# Patient Record
Sex: FEMALE | Race: WHITE | NOT HISPANIC OR LATINO | Employment: OTHER | URBAN - METROPOLITAN AREA
[De-identification: names, ages, dates, MRNs, and addresses within clinical notes are randomized per-mention and may not be internally consistent; named-entity substitution may affect disease eponyms.]

---

## 2017-03-09 RX ORDER — DULOXETIN HYDROCHLORIDE 30 MG/1
CAPSULE, DELAYED RELEASE ORAL
Qty: 60 CAPSULE | Refills: 0 | OUTPATIENT
Start: 2017-03-09

## 2017-11-09 ENCOUNTER — TRANSCRIBE ORDERS (OUTPATIENT)
Dept: SLEEP MEDICINE | Facility: HOSPITAL | Age: 67
End: 2017-11-09

## 2017-11-09 DIAGNOSIS — R29.818 SUSPECTED SLEEP APNEA: Primary | ICD-10-CM

## 2017-11-14 ENCOUNTER — HOSPITAL ENCOUNTER (OUTPATIENT)
Dept: SLEEP MEDICINE | Facility: HOSPITAL | Age: 67
Discharge: HOME | End: 2017-11-14
Admitting: INTERNAL MEDICINE

## 2017-11-14 DIAGNOSIS — R29.818 SUSPECTED SLEEP APNEA: ICD-10-CM

## 2017-11-14 PROCEDURE — 95810 POLYSOM 6/> YRS 4/> PARAM: CPT

## 2017-11-18 RX ORDER — PRAMIPEXOLE DIHYDROCHLORIDE 0.25 MG/1
0.25 TABLET ORAL 3 TIMES DAILY
Qty: 30 TABLET | Refills: 2 | Status: SHIPPED | OUTPATIENT
Start: 2017-11-18 | End: 2017-12-18

## 2017-11-18 RX ORDER — ZOLPIDEM TARTRATE 5 MG/1
TABLET ORAL
Qty: 2 TABLET | Refills: 0 | Status: SHIPPED | OUTPATIENT
Start: 2017-11-18 | End: 2019-04-01 | Stop reason: HOSPADM

## 2017-11-21 ENCOUNTER — TELEPHONE (OUTPATIENT)
Dept: SLEEP MEDICINE | Facility: HOSPITAL | Age: 67
End: 2017-11-21

## 2017-11-21 NOTE — TELEPHONE ENCOUNTER
Spoke with pt about ss results, scheduled titration study for 02/12/17.  Sending abien script to [facility] pharmacy.  Pt already began mirapex

## 2017-12-08 ENCOUNTER — OFFICE VISIT (OUTPATIENT)
Dept: OBSTETRICS AND GYNECOLOGY | Facility: CLINIC | Age: 67
End: 2017-12-08

## 2017-12-08 VITALS
DIASTOLIC BLOOD PRESSURE: 54 MMHG | HEIGHT: 64 IN | WEIGHT: 181 LBS | SYSTOLIC BLOOD PRESSURE: 98 MMHG | HEART RATE: 63 BPM | BODY MASS INDEX: 30.9 KG/M2

## 2017-12-08 DIAGNOSIS — N81.6 RECTOCELE: Primary | ICD-10-CM

## 2017-12-08 PROCEDURE — 99203 OFFICE O/P NEW LOW 30 MIN: CPT | Performed by: OBSTETRICS & GYNECOLOGY

## 2017-12-08 RX ORDER — FLUTICASONE PROPIONATE 50 MCG
SPRAY, SUSPENSION (ML) NASAL
Refills: 0 | COMMUNITY
Start: 2017-12-01

## 2017-12-08 RX ORDER — BENZONATATE 200 MG/1
CAPSULE ORAL
Refills: 0 | COMMUNITY
Start: 2017-11-30 | End: 2017-12-08

## 2017-12-08 RX ORDER — AMITRIPTYLINE HYDROCHLORIDE 100 MG/1
TABLET, FILM COATED ORAL
Refills: 0 | COMMUNITY
Start: 2017-12-01 | End: 2019-04-01 | Stop reason: HOSPADM

## 2017-12-08 RX ORDER — ESTROGENS, CONJUGATED 1.25 MG
TABLET ORAL
Refills: 0 | COMMUNITY
Start: 2017-11-21 | End: 2019-04-01 | Stop reason: HOSPADM

## 2017-12-08 RX ORDER — DULOXETIN HYDROCHLORIDE 30 MG/1
CAPSULE, DELAYED RELEASE ORAL
Refills: 0 | COMMUNITY
Start: 2017-12-01 | End: 2019-04-01 | Stop reason: HOSPADM

## 2017-12-08 RX ORDER — PROPRANOLOL HYDROCHLORIDE 80 MG/1
80 TABLET ORAL 3 TIMES DAILY
Refills: 0 | COMMUNITY
Start: 2017-12-01

## 2017-12-08 RX ORDER — MELOXICAM 15 MG/1
TABLET ORAL
Refills: 0 | COMMUNITY
Start: 2017-11-20 | End: 2019-04-01 | Stop reason: HOSPADM

## 2017-12-08 RX ORDER — OMEPRAZOLE 40 MG/1
CAPSULE, DELAYED RELEASE ORAL
Refills: 0 | COMMUNITY
Start: 2017-11-30 | End: 2019-06-06 | Stop reason: HOSPADM

## 2017-12-08 RX ORDER — LEVOTHYROXINE SODIUM 0.1 MG/1
100 TABLET ORAL DAILY
Refills: 0 | COMMUNITY
Start: 2017-12-04

## 2017-12-08 RX ORDER — POTASSIUM CHLORIDE 750 MG/1
10 TABLET, FILM COATED, EXTENDED RELEASE ORAL 2 TIMES DAILY
Refills: 0 | COMMUNITY
Start: 2017-11-20 | End: 2019-06-06 | Stop reason: HOSPADM

## 2017-12-08 RX ORDER — CETIRIZINE HCL 1 MG/ML
SOLUTION, ORAL ORAL
Refills: 0 | COMMUNITY
Start: 2017-12-01 | End: 2019-04-01 | Stop reason: HOSPADM

## 2017-12-08 RX ORDER — IPRATROPIUM/ALBUTEROL SULFATE 20-100 MCG
MIST INHALER (GRAM) INHALATION
Refills: 0 | COMMUNITY
Start: 2017-10-27 | End: 2019-04-01 | Stop reason: HOSPADM

## 2017-12-08 RX ORDER — ONDANSETRON HYDROCHLORIDE 8 MG/1
TABLET, FILM COATED ORAL
Refills: 0 | COMMUNITY
Start: 2017-11-30

## 2017-12-08 RX ORDER — BACLOFEN 10 MG/1
TABLET ORAL
Refills: 0 | COMMUNITY
Start: 2017-12-05

## 2017-12-08 RX ORDER — CLINDAMYCIN PHOSPHATE 10 MG/G
GEL TOPICAL
Refills: 0 | COMMUNITY
Start: 2017-11-14 | End: 2019-04-01 | Stop reason: HOSPADM

## 2017-12-08 RX ORDER — POLYVINYL ALCOHOL 14 MG/ML
SOLUTION/ DROPS OPHTHALMIC
Refills: 0 | COMMUNITY
Start: 2017-11-10 | End: 2019-06-06 | Stop reason: HOSPADM

## 2017-12-08 RX ORDER — AZELASTINE 1 MG/ML
SPRAY, METERED NASAL
Refills: 0 | COMMUNITY
Start: 2017-11-03 | End: 2019-06-06 | Stop reason: HOSPADM

## 2017-12-08 RX ORDER — FUROSEMIDE 40 MG/1
40 TABLET ORAL 2 TIMES DAILY
Refills: 0 | COMMUNITY
Start: 2017-12-01 | End: 2019-06-06 | Stop reason: HOSPADM

## 2017-12-08 RX ORDER — ERYTHROMYCIN 20 MG/G
GEL TOPICAL
Refills: 0 | COMMUNITY
Start: 2017-12-04 | End: 2019-04-01 | Stop reason: HOSPADM

## 2017-12-08 RX ORDER — METHADONE HYDROCHLORIDE 10 MG/1
TABLET ORAL
Refills: 0 | COMMUNITY
Start: 2017-12-05 | End: 2019-04-01 | Stop reason: HOSPADM

## 2017-12-08 RX ORDER — PHENYTOIN SODIUM 100 MG/1
CAPSULE, EXTENDED RELEASE ORAL
Refills: 0 | COMMUNITY
Start: 2017-11-17

## 2017-12-08 RX ORDER — MONTELUKAST SODIUM 10 MG/1
10 TABLET ORAL NIGHTLY
Refills: 0 | COMMUNITY
Start: 2017-11-15

## 2017-12-08 NOTE — PROGRESS NOTES
Subjective   Emilia Bryant is a 67 y.o. female is a new patient.  She is here because she is having problems with the wall between her vagina and anus.      History of Present Illness  Patient is a 66 yo  that presents with complaints of pain near the back of her vagina and rectal pain.  Patient states she has a bulge in that area and stool will get stuck in it when having a bowel movement.   She denies problems with constipation or diarrhea.  She admits that she must put a finger in the vagina to facilitate a bowel movement.  She denies vaginal bleeding.  She denies problems with urination.  She has a history of two vaginal deliveries and states the first one was forceps assisted.  Her biggest baby was 7.5#.  She denies a history of smoking.  She is no longer sexually active.  She had an abdominal hysterectomy in the 70s for heavy bleeding.    The following portions of the patient's history were reviewed and updated as appropriate: allergies, current medications, past family history, past medical history, past social history, past surgical history and problem list.    Review of Systems   Gastrointestinal: Positive for rectal pain.   Genitourinary: Positive for vaginal pain.   All other systems reviewed and are negative.      Objective   Physical Exam   Constitutional: She appears well-developed and well-nourished.   Cardiovascular: Normal rate and regular rhythm.    Pulmonary/Chest: Effort normal and breath sounds normal.   Abdominal: Soft. She exhibits no distension. There is no tenderness.   Genitourinary: There is no rash, tenderness, lesion or injury on the right labia. There is no rash, tenderness, lesion or injury on the left labia. No erythema, tenderness or bleeding in the vagina. No foreign body in the vagina. No signs of injury around the vagina. No vaginal discharge found.   Genitourinary Comments: Grade 3 rectocele   Neurological: She is alert.   Psychiatric: She has a normal mood and affect.    Vitals reviewed.        Assessment/Plan   Emilia was seen today for gynecologic exam.    Diagnoses and all orders for this visit:    Rectocele  -     Ambulatory Referral to Gynecologic Urology    Patient has a Grade 3 rectocele on exam.  Discussed conservative versus surgical options and would recommend referral to urogynecology to further discuss options.  Patient states she is miserable with the rectocele and is leaning towards surgical management.  Referral placed and patient scheduled with urogyn in January.

## 2018-02-07 ENCOUNTER — TRANSCRIBE ORDERS (OUTPATIENT)
Dept: SLEEP MEDICINE | Facility: HOSPITAL | Age: 68
End: 2018-02-07

## 2018-02-07 DIAGNOSIS — G47.33 OSA (OBSTRUCTIVE SLEEP APNEA): Primary | ICD-10-CM

## 2018-02-12 ENCOUNTER — HOSPITAL ENCOUNTER (OUTPATIENT)
Dept: SLEEP MEDICINE | Facility: HOSPITAL | Age: 68
Discharge: HOME | End: 2018-02-12
Admitting: INTERNAL MEDICINE

## 2018-02-12 DIAGNOSIS — G47.33 OSA (OBSTRUCTIVE SLEEP APNEA): ICD-10-CM

## 2018-02-12 PROCEDURE — 95811 POLYSOM 6/>YRS CPAP 4/> PARM: CPT

## 2018-02-19 ENCOUNTER — TELEPHONE (OUTPATIENT)
Dept: SLEEP MEDICINE | Facility: HOSPITAL | Age: 68
End: 2018-02-19

## 2018-02-19 NOTE — TELEPHONE ENCOUNTER
Sending cpap order to P.  Pt to return call to discuss ss results.  Pt to f/u with dr Eldridge at [facility]

## 2019-03-28 ENCOUNTER — HOSPITAL ENCOUNTER (OUTPATIENT)
Facility: HOSPITAL | Age: 69
Setting detail: OBSERVATION
LOS: 1 days | Discharge: HOME HEALTH | End: 2019-04-01
Attending: EMERGENCY MEDICINE | Admitting: HOSPITALIST

## 2019-03-28 ENCOUNTER — APPOINTMENT (OUTPATIENT)
Dept: GENERAL RADIOLOGY | Facility: HOSPITAL | Age: 69
End: 2019-03-28

## 2019-03-28 ENCOUNTER — APPOINTMENT (OUTPATIENT)
Dept: CT IMAGING | Facility: HOSPITAL | Age: 69
End: 2019-03-28

## 2019-03-28 DIAGNOSIS — H53.9 VISUAL CHANGES: ICD-10-CM

## 2019-03-28 DIAGNOSIS — R42 DIZZY: Primary | ICD-10-CM

## 2019-03-28 DIAGNOSIS — R51.9 ACUTE NONINTRACTABLE HEADACHE, UNSPECIFIED HEADACHE TYPE: ICD-10-CM

## 2019-03-28 DIAGNOSIS — R53.1 GENERALIZED WEAKNESS: ICD-10-CM

## 2019-03-28 DIAGNOSIS — Z74.09 IMPAIRED FUNCTIONAL MOBILITY, BALANCE, GAIT, AND ENDURANCE: ICD-10-CM

## 2019-03-28 PROBLEM — H54.8 LEGALLY BLIND: Status: ACTIVE | Noted: 2019-03-28

## 2019-03-28 PROBLEM — R56.9 SEIZURES (HCC): Status: ACTIVE | Noted: 2019-03-28

## 2019-03-28 PROBLEM — E07.9 DISEASE OF THYROID GLAND: Status: ACTIVE | Noted: 2019-03-28

## 2019-03-28 PROBLEM — J44.9 COPD (CHRONIC OBSTRUCTIVE PULMONARY DISEASE) (HCC): Status: ACTIVE | Noted: 2019-03-28

## 2019-03-28 PROBLEM — G43.909 MIGRAINE: Status: ACTIVE | Noted: 2019-03-28

## 2019-03-28 PROBLEM — M19.90 ARTHRITIS: Status: ACTIVE | Noted: 2019-03-28

## 2019-03-28 PROBLEM — K21.9 GERD (GASTROESOPHAGEAL REFLUX DISEASE): Status: ACTIVE | Noted: 2019-03-28

## 2019-03-28 PROBLEM — I67.1 BRAIN ANEURYSM: Status: ACTIVE | Noted: 2019-03-28

## 2019-03-28 LAB
ALBUMIN SERPL-MCNC: 3.8 G/DL (ref 3.5–5.2)
ALBUMIN/GLOB SERPL: 1.2 G/DL
ALP SERPL-CCNC: 165 U/L (ref 39–117)
ALT SERPL W P-5'-P-CCNC: 16 U/L (ref 1–33)
ANION GAP SERPL CALCULATED.3IONS-SCNC: 12.3 MMOL/L
APTT PPP: 29.7 SECONDS (ref 22.7–35.4)
AST SERPL-CCNC: 20 U/L (ref 1–32)
BASOPHILS # BLD AUTO: 0.04 10*3/MM3 (ref 0–0.2)
BASOPHILS NFR BLD AUTO: 0.6 % (ref 0–1.5)
BILIRUB SERPL-MCNC: 0.2 MG/DL (ref 0.2–1.2)
BILIRUB UR QL STRIP: NEGATIVE
BUN BLD-MCNC: 24 MG/DL (ref 8–23)
BUN/CREAT SERPL: 23.5 (ref 7–25)
CALCIUM SPEC-SCNC: 9.3 MG/DL (ref 8.6–10.5)
CHLORIDE SERPL-SCNC: 88 MMOL/L (ref 98–107)
CLARITY UR: CLEAR
CO2 SERPL-SCNC: 27.7 MMOL/L (ref 22–29)
COLOR UR: YELLOW
CREAT BLD-MCNC: 1.02 MG/DL (ref 0.57–1)
DEPRECATED RDW RBC AUTO: 43.3 FL (ref 37–54)
EOSINOPHIL # BLD AUTO: 0.21 10*3/MM3 (ref 0–0.4)
EOSINOPHIL NFR BLD AUTO: 3 % (ref 0.3–6.2)
ERYTHROCYTE [DISTWIDTH] IN BLOOD BY AUTOMATED COUNT: 12.7 % (ref 12.3–15.4)
GFR SERPL CREATININE-BSD FRML MDRD: 54 ML/MIN/1.73
GLOBULIN UR ELPH-MCNC: 3.2 GM/DL
GLUCOSE BLD-MCNC: 96 MG/DL (ref 65–99)
GLUCOSE UR STRIP-MCNC: NEGATIVE MG/DL
HCT VFR BLD AUTO: 37.1 % (ref 34–46.6)
HGB BLD-MCNC: 12.2 G/DL (ref 12–15.9)
HGB UR QL STRIP.AUTO: NEGATIVE
IMM GRANULOCYTES # BLD AUTO: 0.01 10*3/MM3 (ref 0–0.05)
IMM GRANULOCYTES NFR BLD AUTO: 0.1 % (ref 0–0.5)
INR PPP: 1.03 (ref 0.9–1.1)
KETONES UR QL STRIP: NEGATIVE
LEUKOCYTE ESTERASE UR QL STRIP.AUTO: NEGATIVE
LYMPHOCYTES # BLD AUTO: 1.55 10*3/MM3 (ref 0.7–3.1)
LYMPHOCYTES NFR BLD AUTO: 22.4 % (ref 19.6–45.3)
MCH RBC QN AUTO: 30.6 PG (ref 26.6–33)
MCHC RBC AUTO-ENTMCNC: 32.9 G/DL (ref 31.5–35.7)
MCV RBC AUTO: 93 FL (ref 79–97)
MONOCYTES # BLD AUTO: 0.79 10*3/MM3 (ref 0.1–0.9)
MONOCYTES NFR BLD AUTO: 11.4 % (ref 5–12)
NEUTROPHILS # BLD AUTO: 4.32 10*3/MM3 (ref 1.4–7)
NEUTROPHILS NFR BLD AUTO: 62.5 % (ref 42.7–76)
NITRITE UR QL STRIP: NEGATIVE
NRBC BLD AUTO-RTO: 0 /100 WBC (ref 0–0)
OSMOLALITY SERPL: 291 MOSM/KG (ref 280–301)
OSMOLALITY UR: 183 MOSM/KG
PH UR STRIP.AUTO: 8.5 [PH] (ref 5–8)
PHENYTOIN SERPL-MCNC: 17.1 MCG/ML (ref 10–20)
PLATELET # BLD AUTO: 207 10*3/MM3 (ref 140–450)
PMV BLD AUTO: 9.1 FL (ref 6–12)
POTASSIUM BLD-SCNC: 4.3 MMOL/L (ref 3.5–5.2)
PROT SERPL-MCNC: 7 G/DL (ref 6–8.5)
PROT UR QL STRIP: NEGATIVE
PROTHROMBIN TIME: 13.3 SECONDS (ref 11.7–14.2)
RBC # BLD AUTO: 3.99 10*6/MM3 (ref 3.77–5.28)
SODIUM BLD-SCNC: 128 MMOL/L (ref 136–145)
SODIUM UR-SCNC: 47 MMOL/L
SP GR UR STRIP: 1.01 (ref 1–1.03)
TROPONIN T SERPL-MCNC: <0.01 NG/ML (ref 0–0.03)
UROBILINOGEN UR QL STRIP: ABNORMAL
WBC NRBC COR # BLD: 6.92 10*3/MM3 (ref 3.4–10.8)

## 2019-03-28 PROCEDURE — 84300 ASSAY OF URINE SODIUM: CPT | Performed by: NURSE PRACTITIONER

## 2019-03-28 PROCEDURE — 85025 COMPLETE CBC W/AUTO DIFF WBC: CPT | Performed by: EMERGENCY MEDICINE

## 2019-03-28 PROCEDURE — 85730 THROMBOPLASTIN TIME PARTIAL: CPT | Performed by: EMERGENCY MEDICINE

## 2019-03-28 PROCEDURE — 80185 ASSAY OF PHENYTOIN TOTAL: CPT | Performed by: EMERGENCY MEDICINE

## 2019-03-28 PROCEDURE — 71045 X-RAY EXAM CHEST 1 VIEW: CPT

## 2019-03-28 PROCEDURE — 84484 ASSAY OF TROPONIN QUANT: CPT | Performed by: EMERGENCY MEDICINE

## 2019-03-28 PROCEDURE — 83935 ASSAY OF URINE OSMOLALITY: CPT | Performed by: NURSE PRACTITIONER

## 2019-03-28 PROCEDURE — 99285 EMERGENCY DEPT VISIT HI MDM: CPT

## 2019-03-28 PROCEDURE — 70450 CT HEAD/BRAIN W/O DYE: CPT

## 2019-03-28 PROCEDURE — 36415 COLL VENOUS BLD VENIPUNCTURE: CPT | Performed by: NURSE PRACTITIONER

## 2019-03-28 PROCEDURE — 96376 TX/PRO/DX INJ SAME DRUG ADON: CPT

## 2019-03-28 PROCEDURE — 96361 HYDRATE IV INFUSION ADD-ON: CPT

## 2019-03-28 PROCEDURE — 81003 URINALYSIS AUTO W/O SCOPE: CPT | Performed by: EMERGENCY MEDICINE

## 2019-03-28 PROCEDURE — G0378 HOSPITAL OBSERVATION PER HR: HCPCS

## 2019-03-28 PROCEDURE — 83930 ASSAY OF BLOOD OSMOLALITY: CPT | Performed by: NURSE PRACTITIONER

## 2019-03-28 PROCEDURE — 96374 THER/PROPH/DIAG INJ IV PUSH: CPT

## 2019-03-28 PROCEDURE — 25010000002 ONDANSETRON PER 1 MG: Performed by: HOSPITALIST

## 2019-03-28 PROCEDURE — 80053 COMPREHEN METABOLIC PANEL: CPT | Performed by: EMERGENCY MEDICINE

## 2019-03-28 PROCEDURE — 93010 ELECTROCARDIOGRAM REPORT: CPT | Performed by: INTERNAL MEDICINE

## 2019-03-28 PROCEDURE — 85610 PROTHROMBIN TIME: CPT | Performed by: EMERGENCY MEDICINE

## 2019-03-28 PROCEDURE — 93005 ELECTROCARDIOGRAM TRACING: CPT | Performed by: EMERGENCY MEDICINE

## 2019-03-28 PROCEDURE — 36415 COLL VENOUS BLD VENIPUNCTURE: CPT

## 2019-03-28 PROCEDURE — 25010000002 ONDANSETRON PER 1 MG: Performed by: NURSE PRACTITIONER

## 2019-03-28 RX ORDER — SODIUM CHLORIDE 0.9 % (FLUSH) 0.9 %
10 SYRINGE (ML) INJECTION AS NEEDED
Status: DISCONTINUED | OUTPATIENT
Start: 2019-03-28 | End: 2019-04-01 | Stop reason: HOSPADM

## 2019-03-28 RX ORDER — PHENYTOIN SODIUM 100 MG/1
300 CAPSULE, EXTENDED RELEASE ORAL NIGHTLY
Status: DISCONTINUED | OUTPATIENT
Start: 2019-03-28 | End: 2019-04-01 | Stop reason: HOSPADM

## 2019-03-28 RX ORDER — PROPRANOLOL HYDROCHLORIDE 40 MG/1
80 TABLET ORAL 3 TIMES DAILY
Status: DISCONTINUED | OUTPATIENT
Start: 2019-03-28 | End: 2019-04-01 | Stop reason: HOSPADM

## 2019-03-28 RX ORDER — SODIUM CHLORIDE 9 MG/ML
100 INJECTION, SOLUTION INTRAVENOUS CONTINUOUS
Status: DISCONTINUED | OUTPATIENT
Start: 2019-03-28 | End: 2019-03-29

## 2019-03-28 RX ORDER — ONDANSETRON 4 MG/1
4 TABLET, ORALLY DISINTEGRATING ORAL EVERY 6 HOURS PRN
Status: DISCONTINUED | OUTPATIENT
Start: 2019-03-28 | End: 2019-03-28

## 2019-03-28 RX ORDER — SODIUM CHLORIDE 0.9 % (FLUSH) 0.9 %
1-10 SYRINGE (ML) INJECTION AS NEEDED
Status: DISCONTINUED | OUTPATIENT
Start: 2019-03-28 | End: 2019-04-01 | Stop reason: HOSPADM

## 2019-03-28 RX ORDER — ONDANSETRON 2 MG/ML
4 INJECTION INTRAMUSCULAR; INTRAVENOUS EVERY 6 HOURS PRN
Status: DISCONTINUED | OUTPATIENT
Start: 2019-03-28 | End: 2019-03-28

## 2019-03-28 RX ORDER — LEVOTHYROXINE SODIUM 0.1 MG/1
100 TABLET ORAL DAILY
Status: DISCONTINUED | OUTPATIENT
Start: 2019-03-28 | End: 2019-04-01 | Stop reason: HOSPADM

## 2019-03-28 RX ORDER — ONDANSETRON 4 MG/1
4 TABLET, FILM COATED ORAL EVERY 6 HOURS PRN
Status: DISCONTINUED | OUTPATIENT
Start: 2019-03-28 | End: 2019-03-28

## 2019-03-28 RX ORDER — ONDANSETRON 2 MG/ML
4 INJECTION INTRAMUSCULAR; INTRAVENOUS EVERY 4 HOURS PRN
Status: DISCONTINUED | OUTPATIENT
Start: 2019-03-28 | End: 2019-04-01 | Stop reason: HOSPADM

## 2019-03-28 RX ORDER — ONDANSETRON 4 MG/1
4 TABLET, ORALLY DISINTEGRATING ORAL EVERY 6 HOURS PRN
Status: DISCONTINUED | OUTPATIENT
Start: 2019-03-28 | End: 2019-04-01 | Stop reason: HOSPADM

## 2019-03-28 RX ORDER — SODIUM CHLORIDE 0.9 % (FLUSH) 0.9 %
3 SYRINGE (ML) INJECTION EVERY 12 HOURS SCHEDULED
Status: DISCONTINUED | OUTPATIENT
Start: 2019-03-28 | End: 2019-04-01 | Stop reason: HOSPADM

## 2019-03-28 RX ORDER — ONDANSETRON 4 MG/1
4 TABLET, FILM COATED ORAL EVERY 6 HOURS PRN
Status: DISCONTINUED | OUTPATIENT
Start: 2019-03-28 | End: 2019-04-01 | Stop reason: HOSPADM

## 2019-03-28 RX ORDER — ACETAMINOPHEN 325 MG/1
650 TABLET ORAL EVERY 4 HOURS PRN
Status: DISCONTINUED | OUTPATIENT
Start: 2019-03-28 | End: 2019-04-01 | Stop reason: HOSPADM

## 2019-03-28 RX ORDER — PANTOPRAZOLE SODIUM 40 MG/1
40 TABLET, DELAYED RELEASE ORAL EVERY MORNING
Status: DISCONTINUED | OUTPATIENT
Start: 2019-03-29 | End: 2019-04-01 | Stop reason: HOSPADM

## 2019-03-28 RX ORDER — NITROGLYCERIN 0.4 MG/1
0.4 TABLET SUBLINGUAL
Status: DISCONTINUED | OUTPATIENT
Start: 2019-03-28 | End: 2019-04-01 | Stop reason: HOSPADM

## 2019-03-28 RX ORDER — MONTELUKAST SODIUM 10 MG/1
10 TABLET ORAL NIGHTLY
Status: DISCONTINUED | OUTPATIENT
Start: 2019-03-28 | End: 2019-04-01 | Stop reason: HOSPADM

## 2019-03-28 RX ORDER — ONDANSETRON HYDROCHLORIDE 8 MG/1
8 TABLET, FILM COATED ORAL 3 TIMES DAILY
Status: DISCONTINUED | OUTPATIENT
Start: 2019-03-28 | End: 2019-03-30

## 2019-03-28 RX ADMIN — PROPRANOLOL HYDROCHLORIDE 80 MG: 40 TABLET ORAL at 18:35

## 2019-03-28 RX ADMIN — ACETAMINOPHEN 650 MG: 325 TABLET, FILM COATED ORAL at 13:43

## 2019-03-28 RX ADMIN — PHENYTOIN SODIUM 300 MG: 100 CAPSULE, EXTENDED RELEASE ORAL at 20:04

## 2019-03-28 RX ADMIN — ONDANSETRON 8 MG: 8 TABLET, FILM COATED ORAL at 20:04

## 2019-03-28 RX ADMIN — LEVOTHYROXINE SODIUM 100 MCG: 100 TABLET ORAL at 18:35

## 2019-03-28 RX ADMIN — ACETAMINOPHEN 650 MG: 325 TABLET, FILM COATED ORAL at 20:06

## 2019-03-28 RX ADMIN — ONDANSETRON HYDROCHLORIDE 4 MG: 2 SOLUTION INTRAMUSCULAR; INTRAVENOUS at 11:42

## 2019-03-28 RX ADMIN — ONDANSETRON 8 MG: 8 TABLET, FILM COATED ORAL at 18:34

## 2019-03-28 RX ADMIN — SODIUM CHLORIDE 100 ML/HR: 9 INJECTION, SOLUTION INTRAVENOUS at 17:29

## 2019-03-28 RX ADMIN — PROPRANOLOL HYDROCHLORIDE 80 MG: 40 TABLET ORAL at 20:04

## 2019-03-28 RX ADMIN — SODIUM CHLORIDE, PRESERVATIVE FREE 3 ML: 5 INJECTION INTRAVENOUS at 09:01

## 2019-03-28 RX ADMIN — ONDANSETRON 4 MG: 2 INJECTION INTRAMUSCULAR; INTRAVENOUS at 05:38

## 2019-03-28 RX ADMIN — SODIUM CHLORIDE 1000 ML: 9 INJECTION, SOLUTION INTRAVENOUS at 04:35

## 2019-03-28 RX ADMIN — SODIUM CHLORIDE, PRESERVATIVE FREE 3 ML: 5 INJECTION INTRAVENOUS at 20:08

## 2019-03-28 RX ADMIN — MONTELUKAST 10 MG: 10 TABLET, FILM COATED ORAL at 20:04

## 2019-03-29 LAB
ANION GAP SERPL CALCULATED.3IONS-SCNC: 9.9 MMOL/L
BASOPHILS # BLD AUTO: 0.03 10*3/MM3 (ref 0–0.2)
BASOPHILS NFR BLD AUTO: 0.8 % (ref 0–1.5)
BUN BLD-MCNC: 16 MG/DL (ref 8–23)
BUN/CREAT SERPL: 16.3 (ref 7–25)
CALCIUM SPEC-SCNC: 8.6 MG/DL (ref 8.6–10.5)
CHLORIDE SERPL-SCNC: 102 MMOL/L (ref 98–107)
CO2 SERPL-SCNC: 26.1 MMOL/L (ref 22–29)
CREAT BLD-MCNC: 0.98 MG/DL (ref 0.57–1)
DEPRECATED RDW RBC AUTO: 46.8 FL (ref 37–54)
EOSINOPHIL # BLD AUTO: 0.16 10*3/MM3 (ref 0–0.4)
EOSINOPHIL NFR BLD AUTO: 4 % (ref 0.3–6.2)
ERYTHROCYTE [DISTWIDTH] IN BLOOD BY AUTOMATED COUNT: 13.2 % (ref 12.3–15.4)
GFR SERPL CREATININE-BSD FRML MDRD: 56 ML/MIN/1.73
GLUCOSE BLD-MCNC: 124 MG/DL (ref 65–99)
HCT VFR BLD AUTO: 33.2 % (ref 34–46.6)
HGB BLD-MCNC: 10.6 G/DL (ref 12–15.9)
IMM GRANULOCYTES # BLD AUTO: 0.02 10*3/MM3 (ref 0–0.05)
IMM GRANULOCYTES NFR BLD AUTO: 0.5 % (ref 0–0.5)
LYMPHOCYTES # BLD AUTO: 1.6 10*3/MM3 (ref 0.7–3.1)
LYMPHOCYTES NFR BLD AUTO: 40.4 % (ref 19.6–45.3)
MCH RBC QN AUTO: 30.6 PG (ref 26.6–33)
MCHC RBC AUTO-ENTMCNC: 31.9 G/DL (ref 31.5–35.7)
MCV RBC AUTO: 96 FL (ref 79–97)
MONOCYTES # BLD AUTO: 0.51 10*3/MM3 (ref 0.1–0.9)
MONOCYTES NFR BLD AUTO: 12.9 % (ref 5–12)
NEUTROPHILS # BLD AUTO: 1.64 10*3/MM3 (ref 1.4–7)
NEUTROPHILS NFR BLD AUTO: 41.4 % (ref 42.7–76)
NRBC BLD AUTO-RTO: 0 /100 WBC (ref 0–0)
PLATELET # BLD AUTO: 188 10*3/MM3 (ref 140–450)
PMV BLD AUTO: 9.1 FL (ref 6–12)
POTASSIUM BLD-SCNC: 3.9 MMOL/L (ref 3.5–5.2)
RBC # BLD AUTO: 3.46 10*6/MM3 (ref 3.77–5.28)
SODIUM BLD-SCNC: 138 MMOL/L (ref 136–145)
TROPONIN T SERPL-MCNC: <0.01 NG/ML (ref 0–0.03)
TSH SERPL DL<=0.05 MIU/L-ACNC: 4.16 MIU/ML (ref 0.27–4.2)
VIT B12 BLD-MCNC: 618 PG/ML (ref 211–946)
WBC NRBC COR # BLD: 3.96 10*3/MM3 (ref 3.4–10.8)

## 2019-03-29 PROCEDURE — G0378 HOSPITAL OBSERVATION PER HR: HCPCS

## 2019-03-29 PROCEDURE — 80048 BASIC METABOLIC PNL TOTAL CA: CPT | Performed by: HOSPITALIST

## 2019-03-29 PROCEDURE — 82607 VITAMIN B-12: CPT | Performed by: HOSPITALIST

## 2019-03-29 PROCEDURE — 84484 ASSAY OF TROPONIN QUANT: CPT | Performed by: HOSPITALIST

## 2019-03-29 PROCEDURE — 99203 OFFICE O/P NEW LOW 30 MIN: CPT | Performed by: PSYCHIATRY & NEUROLOGY

## 2019-03-29 PROCEDURE — 63710000001 DIPHENHYDRAMINE PER 50 MG: Performed by: INTERNAL MEDICINE

## 2019-03-29 PROCEDURE — 84443 ASSAY THYROID STIM HORMONE: CPT | Performed by: HOSPITALIST

## 2019-03-29 PROCEDURE — 85025 COMPLETE CBC W/AUTO DIFF WBC: CPT | Performed by: HOSPITALIST

## 2019-03-29 PROCEDURE — 96361 HYDRATE IV INFUSION ADD-ON: CPT

## 2019-03-29 RX ORDER — CALCIUM CARBONATE 200(500)MG
2 TABLET,CHEWABLE ORAL ONCE
Status: COMPLETED | OUTPATIENT
Start: 2019-03-29 | End: 2019-03-29

## 2019-03-29 RX ORDER — ZOLPIDEM TARTRATE 5 MG/1
5 TABLET ORAL NIGHTLY PRN
Status: DISCONTINUED | OUTPATIENT
Start: 2019-03-29 | End: 2019-03-30

## 2019-03-29 RX ORDER — DIPHENHYDRAMINE HCL 25 MG
25 CAPSULE ORAL NIGHTLY PRN
Status: DISCONTINUED | OUTPATIENT
Start: 2019-03-29 | End: 2019-03-30

## 2019-03-29 RX ORDER — HYDROCODONE BITARTRATE AND ACETAMINOPHEN 5; 325 MG/1; MG/1
1 TABLET ORAL EVERY 6 HOURS PRN
Status: DISCONTINUED | OUTPATIENT
Start: 2019-03-29 | End: 2019-04-01 | Stop reason: HOSPADM

## 2019-03-29 RX ADMIN — ONDANSETRON 8 MG: 8 TABLET, FILM COATED ORAL at 16:31

## 2019-03-29 RX ADMIN — PROPRANOLOL HYDROCHLORIDE 80 MG: 40 TABLET ORAL at 08:21

## 2019-03-29 RX ADMIN — SODIUM CHLORIDE, PRESERVATIVE FREE 3 ML: 5 INJECTION INTRAVENOUS at 22:34

## 2019-03-29 RX ADMIN — SODIUM CHLORIDE 100 ML/HR: 9 INJECTION, SOLUTION INTRAVENOUS at 13:54

## 2019-03-29 RX ADMIN — PROPRANOLOL HYDROCHLORIDE 80 MG: 40 TABLET ORAL at 22:32

## 2019-03-29 RX ADMIN — Medication 2 TABLET: at 02:50

## 2019-03-29 RX ADMIN — LEVOTHYROXINE SODIUM 100 MCG: 100 TABLET ORAL at 10:14

## 2019-03-29 RX ADMIN — HYDROCODONE BITARTRATE AND ACETAMINOPHEN 1 TABLET: 5; 325 TABLET ORAL at 23:13

## 2019-03-29 RX ADMIN — PANTOPRAZOLE SODIUM 40 MG: 40 TABLET, DELAYED RELEASE ORAL at 06:38

## 2019-03-29 RX ADMIN — DIPHENHYDRAMINE HYDROCHLORIDE 25 MG: 25 CAPSULE ORAL at 23:13

## 2019-03-29 RX ADMIN — PHENYTOIN SODIUM 300 MG: 100 CAPSULE, EXTENDED RELEASE ORAL at 22:32

## 2019-03-29 RX ADMIN — SODIUM CHLORIDE, PRESERVATIVE FREE 3 ML: 5 INJECTION INTRAVENOUS at 08:22

## 2019-03-29 RX ADMIN — ACETAMINOPHEN 650 MG: 325 TABLET, FILM COATED ORAL at 00:55

## 2019-03-29 RX ADMIN — ONDANSETRON 8 MG: 8 TABLET, FILM COATED ORAL at 10:14

## 2019-03-29 RX ADMIN — PROPRANOLOL HYDROCHLORIDE 80 MG: 40 TABLET ORAL at 16:31

## 2019-03-29 RX ADMIN — MONTELUKAST 10 MG: 10 TABLET, FILM COATED ORAL at 22:41

## 2019-03-29 RX ADMIN — HYDROCODONE BITARTRATE AND ACETAMINOPHEN 1 TABLET: 5; 325 TABLET ORAL at 16:31

## 2019-03-29 RX ADMIN — ACETAMINOPHEN 650 MG: 325 TABLET, FILM COATED ORAL at 08:21

## 2019-03-29 RX ADMIN — ONDANSETRON 8 MG: 8 TABLET, FILM COATED ORAL at 22:32

## 2019-03-30 ENCOUNTER — APPOINTMENT (OUTPATIENT)
Dept: GENERAL RADIOLOGY | Facility: HOSPITAL | Age: 69
End: 2019-03-30

## 2019-03-30 PROBLEM — R11.2 NAUSEA & VOMITING: Status: ACTIVE | Noted: 2019-03-30

## 2019-03-30 LAB
ANION GAP SERPL CALCULATED.3IONS-SCNC: 9.7 MMOL/L
BASOPHILS # BLD AUTO: 0.04 10*3/MM3 (ref 0–0.2)
BASOPHILS NFR BLD AUTO: 0.8 % (ref 0–1.5)
BUN BLD-MCNC: 11 MG/DL (ref 8–23)
BUN/CREAT SERPL: 11.8 (ref 7–25)
CALCIUM SPEC-SCNC: 9.2 MG/DL (ref 8.6–10.5)
CHLORIDE SERPL-SCNC: 102 MMOL/L (ref 98–107)
CO2 SERPL-SCNC: 27.3 MMOL/L (ref 22–29)
CREAT BLD-MCNC: 0.93 MG/DL (ref 0.57–1)
DEPRECATED RDW RBC AUTO: 46.3 FL (ref 37–54)
EOSINOPHIL # BLD AUTO: 0.22 10*3/MM3 (ref 0–0.4)
EOSINOPHIL NFR BLD AUTO: 4.4 % (ref 0.3–6.2)
ERYTHROCYTE [DISTWIDTH] IN BLOOD BY AUTOMATED COUNT: 13.2 % (ref 12.3–15.4)
GFR SERPL CREATININE-BSD FRML MDRD: 60 ML/MIN/1.73
GLUCOSE BLD-MCNC: 97 MG/DL (ref 65–99)
HCT VFR BLD AUTO: 34.6 % (ref 34–46.6)
HGB BLD-MCNC: 11 G/DL (ref 12–15.9)
IMM GRANULOCYTES # BLD AUTO: 0.01 10*3/MM3 (ref 0–0.05)
IMM GRANULOCYTES NFR BLD AUTO: 0.2 % (ref 0–0.5)
LYMPHOCYTES # BLD AUTO: 1.7 10*3/MM3 (ref 0.7–3.1)
LYMPHOCYTES NFR BLD AUTO: 34.3 % (ref 19.6–45.3)
MCH RBC QN AUTO: 30.1 PG (ref 26.6–33)
MCHC RBC AUTO-ENTMCNC: 31.8 G/DL (ref 31.5–35.7)
MCV RBC AUTO: 94.8 FL (ref 79–97)
MONOCYTES # BLD AUTO: 0.74 10*3/MM3 (ref 0.1–0.9)
MONOCYTES NFR BLD AUTO: 14.9 % (ref 5–12)
NEUTROPHILS # BLD AUTO: 2.24 10*3/MM3 (ref 1.4–7)
NEUTROPHILS NFR BLD AUTO: 45.4 % (ref 42.7–76)
NRBC BLD AUTO-RTO: 0 /100 WBC (ref 0–0)
PLATELET # BLD AUTO: 198 10*3/MM3 (ref 140–450)
PMV BLD AUTO: 8.9 FL (ref 6–12)
POTASSIUM BLD-SCNC: 4.3 MMOL/L (ref 3.5–5.2)
RBC # BLD AUTO: 3.65 10*6/MM3 (ref 3.77–5.28)
SODIUM BLD-SCNC: 139 MMOL/L (ref 136–145)
WBC NRBC COR # BLD: 4.95 10*3/MM3 (ref 3.4–10.8)

## 2019-03-30 PROCEDURE — 80048 BASIC METABOLIC PNL TOTAL CA: CPT | Performed by: HOSPITALIST

## 2019-03-30 PROCEDURE — 63710000001 DIPHENHYDRAMINE PER 50 MG: Performed by: HOSPITALIST

## 2019-03-30 PROCEDURE — 93010 ELECTROCARDIOGRAM REPORT: CPT | Performed by: INTERNAL MEDICINE

## 2019-03-30 PROCEDURE — 85025 COMPLETE CBC W/AUTO DIFF WBC: CPT | Performed by: HOSPITALIST

## 2019-03-30 PROCEDURE — 93005 ELECTROCARDIOGRAM TRACING: CPT | Performed by: HOSPITALIST

## 2019-03-30 PROCEDURE — 97110 THERAPEUTIC EXERCISES: CPT

## 2019-03-30 PROCEDURE — G0378 HOSPITAL OBSERVATION PER HR: HCPCS

## 2019-03-30 PROCEDURE — 96376 TX/PRO/DX INJ SAME DRUG ADON: CPT

## 2019-03-30 PROCEDURE — 25010000002 ONDANSETRON PER 1 MG: Performed by: HOSPITALIST

## 2019-03-30 PROCEDURE — 97161 PT EVAL LOW COMPLEX 20 MIN: CPT

## 2019-03-30 PROCEDURE — 74018 RADEX ABDOMEN 1 VIEW: CPT

## 2019-03-30 RX ORDER — CALCIUM CARBONATE 200(500)MG
2 TABLET,CHEWABLE ORAL 3 TIMES DAILY PRN
Status: DISCONTINUED | OUTPATIENT
Start: 2019-03-30 | End: 2019-04-01 | Stop reason: HOSPADM

## 2019-03-30 RX ORDER — DIPHENHYDRAMINE HCL 25 MG
50 CAPSULE ORAL NIGHTLY PRN
Status: DISCONTINUED | OUTPATIENT
Start: 2019-03-30 | End: 2019-04-01 | Stop reason: HOSPADM

## 2019-03-30 RX ADMIN — PROPRANOLOL HYDROCHLORIDE 80 MG: 40 TABLET ORAL at 16:35

## 2019-03-30 RX ADMIN — ONDANSETRON 8 MG: 8 TABLET, FILM COATED ORAL at 08:36

## 2019-03-30 RX ADMIN — SODIUM CHLORIDE, PRESERVATIVE FREE 3 ML: 5 INJECTION INTRAVENOUS at 22:35

## 2019-03-30 RX ADMIN — HYDROCODONE BITARTRATE AND ACETAMINOPHEN 1 TABLET: 5; 325 TABLET ORAL at 08:39

## 2019-03-30 RX ADMIN — ONDANSETRON HYDROCHLORIDE 4 MG: 2 SOLUTION INTRAMUSCULAR; INTRAVENOUS at 16:47

## 2019-03-30 RX ADMIN — Medication 2 TABLET: at 06:36

## 2019-03-30 RX ADMIN — PROPRANOLOL HYDROCHLORIDE 80 MG: 40 TABLET ORAL at 08:36

## 2019-03-30 RX ADMIN — HYDROCODONE BITARTRATE AND ACETAMINOPHEN 1 TABLET: 5; 325 TABLET ORAL at 14:58

## 2019-03-30 RX ADMIN — MONTELUKAST 10 MG: 10 TABLET, FILM COATED ORAL at 22:25

## 2019-03-30 RX ADMIN — PANTOPRAZOLE SODIUM 40 MG: 40 TABLET, DELAYED RELEASE ORAL at 06:29

## 2019-03-30 RX ADMIN — ONDANSETRON HYDROCHLORIDE 4 MG: 2 SOLUTION INTRAMUSCULAR; INTRAVENOUS at 23:59

## 2019-03-30 RX ADMIN — ONDANSETRON HYDROCHLORIDE 4 MG: 2 SOLUTION INTRAMUSCULAR; INTRAVENOUS at 10:15

## 2019-03-30 RX ADMIN — SODIUM CHLORIDE, PRESERVATIVE FREE 3 ML: 5 INJECTION INTRAVENOUS at 08:42

## 2019-03-30 RX ADMIN — PHENYTOIN SODIUM 300 MG: 100 CAPSULE, EXTENDED RELEASE ORAL at 22:25

## 2019-03-30 RX ADMIN — ONDANSETRON 8 MG: 8 TABLET, FILM COATED ORAL at 14:58

## 2019-03-30 RX ADMIN — PROPRANOLOL HYDROCHLORIDE 80 MG: 40 TABLET ORAL at 22:34

## 2019-03-30 RX ADMIN — DIPHENHYDRAMINE HYDROCHLORIDE 50 MG: 25 CAPSULE ORAL at 22:26

## 2019-03-30 RX ADMIN — LEVOTHYROXINE SODIUM 100 MCG: 100 TABLET ORAL at 06:29

## 2019-03-31 LAB
ALBUMIN SERPL-MCNC: 3.4 G/DL (ref 3.5–5.2)
ALBUMIN/GLOB SERPL: 1.2 G/DL
ALP SERPL-CCNC: 150 U/L (ref 39–117)
ALT SERPL W P-5'-P-CCNC: 13 U/L (ref 1–33)
ANION GAP SERPL CALCULATED.3IONS-SCNC: 9.9 MMOL/L
AST SERPL-CCNC: 17 U/L (ref 1–32)
BILIRUB SERPL-MCNC: 0.2 MG/DL (ref 0.2–1.2)
BUN BLD-MCNC: 10 MG/DL (ref 8–23)
BUN/CREAT SERPL: 11.9 (ref 7–25)
CALCIUM SPEC-SCNC: 9.1 MG/DL (ref 8.6–10.5)
CHLORIDE SERPL-SCNC: 101 MMOL/L (ref 98–107)
CO2 SERPL-SCNC: 28.1 MMOL/L (ref 22–29)
CREAT BLD-MCNC: 0.84 MG/DL (ref 0.57–1)
GFR SERPL CREATININE-BSD FRML MDRD: 67 ML/MIN/1.73
GLOBULIN UR ELPH-MCNC: 2.8 GM/DL
GLUCOSE BLD-MCNC: 88 MG/DL (ref 65–99)
POTASSIUM BLD-SCNC: 3.9 MMOL/L (ref 3.5–5.2)
PROT SERPL-MCNC: 6.2 G/DL (ref 6–8.5)
SODIUM BLD-SCNC: 139 MMOL/L (ref 136–145)

## 2019-03-31 PROCEDURE — 63710000001 DIPHENHYDRAMINE PER 50 MG: Performed by: HOSPITALIST

## 2019-03-31 PROCEDURE — 96376 TX/PRO/DX INJ SAME DRUG ADON: CPT

## 2019-03-31 PROCEDURE — 80053 COMPREHEN METABOLIC PANEL: CPT | Performed by: HOSPITALIST

## 2019-03-31 PROCEDURE — G0378 HOSPITAL OBSERVATION PER HR: HCPCS

## 2019-03-31 PROCEDURE — 97110 THERAPEUTIC EXERCISES: CPT

## 2019-03-31 PROCEDURE — 25010000002 ONDANSETRON PER 1 MG: Performed by: HOSPITALIST

## 2019-03-31 RX ADMIN — ONDANSETRON HYDROCHLORIDE 4 MG: 2 SOLUTION INTRAMUSCULAR; INTRAVENOUS at 08:56

## 2019-03-31 RX ADMIN — ONDANSETRON HYDROCHLORIDE 4 MG: 2 SOLUTION INTRAMUSCULAR; INTRAVENOUS at 23:35

## 2019-03-31 RX ADMIN — DIPHENHYDRAMINE HYDROCHLORIDE 50 MG: 25 CAPSULE ORAL at 21:44

## 2019-03-31 RX ADMIN — SODIUM CHLORIDE, PRESERVATIVE FREE 3 ML: 5 INJECTION INTRAVENOUS at 23:38

## 2019-03-31 RX ADMIN — PROPRANOLOL HYDROCHLORIDE 80 MG: 40 TABLET ORAL at 16:26

## 2019-03-31 RX ADMIN — PANTOPRAZOLE SODIUM 40 MG: 40 TABLET, DELAYED RELEASE ORAL at 07:01

## 2019-03-31 RX ADMIN — PROPRANOLOL HYDROCHLORIDE 80 MG: 40 TABLET ORAL at 21:44

## 2019-03-31 RX ADMIN — HYDROCODONE BITARTRATE AND ACETAMINOPHEN 1 TABLET: 5; 325 TABLET ORAL at 08:56

## 2019-03-31 RX ADMIN — MONTELUKAST 10 MG: 10 TABLET, FILM COATED ORAL at 21:44

## 2019-03-31 RX ADMIN — PHENYTOIN SODIUM 300 MG: 100 CAPSULE, EXTENDED RELEASE ORAL at 21:44

## 2019-03-31 RX ADMIN — SODIUM CHLORIDE, PRESERVATIVE FREE 3 ML: 5 INJECTION INTRAVENOUS at 08:56

## 2019-03-31 RX ADMIN — HYDROCODONE BITARTRATE AND ACETAMINOPHEN 1 TABLET: 5; 325 TABLET ORAL at 15:12

## 2019-03-31 RX ADMIN — PROPRANOLOL HYDROCHLORIDE 80 MG: 40 TABLET ORAL at 08:56

## 2019-03-31 RX ADMIN — LEVOTHYROXINE SODIUM 100 MCG: 100 TABLET ORAL at 07:01

## 2019-03-31 RX ADMIN — ONDANSETRON HYDROCHLORIDE 4 MG: 2 SOLUTION INTRAMUSCULAR; INTRAVENOUS at 13:07

## 2019-03-31 RX ADMIN — HYDROCODONE BITARTRATE AND ACETAMINOPHEN 1 TABLET: 5; 325 TABLET ORAL at 21:44

## 2019-04-01 ENCOUNTER — APPOINTMENT (OUTPATIENT)
Dept: CT IMAGING | Facility: HOSPITAL | Age: 69
End: 2019-04-01

## 2019-04-01 VITALS
DIASTOLIC BLOOD PRESSURE: 60 MMHG | TEMPERATURE: 98.1 F | HEIGHT: 66 IN | BODY MASS INDEX: 26.42 KG/M2 | HEART RATE: 58 BPM | RESPIRATION RATE: 15 BRPM | OXYGEN SATURATION: 94 % | WEIGHT: 164.4 LBS | SYSTOLIC BLOOD PRESSURE: 130 MMHG

## 2019-04-01 PROBLEM — R10.13 ABDOMINAL PAIN, EPIGASTRIC: Status: ACTIVE | Noted: 2019-04-01

## 2019-04-01 LAB
ALBUMIN SERPL-MCNC: 3.3 G/DL (ref 3.5–5.2)
ALBUMIN/GLOB SERPL: 1.2 G/DL
ALP SERPL-CCNC: 141 U/L (ref 39–117)
ALT SERPL W P-5'-P-CCNC: 13 U/L (ref 1–33)
ANION GAP SERPL CALCULATED.3IONS-SCNC: 12.2 MMOL/L
AST SERPL-CCNC: 17 U/L (ref 1–32)
BILIRUB SERPL-MCNC: 0.2 MG/DL (ref 0.2–1.2)
BUN BLD-MCNC: 11 MG/DL (ref 8–23)
BUN/CREAT SERPL: 12.5 (ref 7–25)
CALCIUM SPEC-SCNC: 9.2 MG/DL (ref 8.6–10.5)
CHLORIDE SERPL-SCNC: 98 MMOL/L (ref 98–107)
CO2 SERPL-SCNC: 24.8 MMOL/L (ref 22–29)
CREAT BLD-MCNC: 0.88 MG/DL (ref 0.57–1)
DEPRECATED RDW RBC AUTO: 44.6 FL (ref 37–54)
ERYTHROCYTE [DISTWIDTH] IN BLOOD BY AUTOMATED COUNT: 13 % (ref 12.3–15.4)
GFR SERPL CREATININE-BSD FRML MDRD: 64 ML/MIN/1.73
GLOBULIN UR ELPH-MCNC: 2.7 GM/DL
GLUCOSE BLD-MCNC: 166 MG/DL (ref 65–99)
HCT VFR BLD AUTO: 35.3 % (ref 34–46.6)
HGB BLD-MCNC: 11.3 G/DL (ref 12–15.9)
MCH RBC QN AUTO: 29.8 PG (ref 26.6–33)
MCHC RBC AUTO-ENTMCNC: 32 G/DL (ref 31.5–35.7)
MCV RBC AUTO: 93.1 FL (ref 79–97)
PLATELET # BLD AUTO: 188 10*3/MM3 (ref 140–450)
PMV BLD AUTO: 9 FL (ref 6–12)
POTASSIUM BLD-SCNC: 4.1 MMOL/L (ref 3.5–5.2)
PROT SERPL-MCNC: 6 G/DL (ref 6–8.5)
RBC # BLD AUTO: 3.79 10*6/MM3 (ref 3.77–5.28)
SODIUM BLD-SCNC: 135 MMOL/L (ref 136–145)
WBC NRBC COR # BLD: 4.27 10*3/MM3 (ref 3.4–10.8)

## 2019-04-01 PROCEDURE — 96376 TX/PRO/DX INJ SAME DRUG ADON: CPT

## 2019-04-01 PROCEDURE — 80053 COMPREHEN METABOLIC PANEL: CPT | Performed by: HOSPITALIST

## 2019-04-01 PROCEDURE — G0378 HOSPITAL OBSERVATION PER HR: HCPCS

## 2019-04-01 PROCEDURE — 85027 COMPLETE CBC AUTOMATED: CPT | Performed by: HOSPITALIST

## 2019-04-01 PROCEDURE — 25010000002 ONDANSETRON PER 1 MG: Performed by: HOSPITALIST

## 2019-04-01 PROCEDURE — 74176 CT ABD & PELVIS W/O CONTRAST: CPT

## 2019-04-01 RX ADMIN — ONDANSETRON HYDROCHLORIDE 4 MG: 2 SOLUTION INTRAMUSCULAR; INTRAVENOUS at 08:30

## 2019-04-01 RX ADMIN — LEVOTHYROXINE SODIUM 100 MCG: 100 TABLET ORAL at 07:44

## 2019-04-01 RX ADMIN — PANTOPRAZOLE SODIUM 40 MG: 40 TABLET, DELAYED RELEASE ORAL at 07:44

## 2019-04-01 RX ADMIN — PROPRANOLOL HYDROCHLORIDE 80 MG: 40 TABLET ORAL at 08:30

## 2019-04-01 RX ADMIN — SODIUM CHLORIDE, PRESERVATIVE FREE 3 ML: 5 INJECTION INTRAVENOUS at 08:31

## 2019-04-01 NOTE — DISCHARGE PLACEMENT REQUEST
"Emilia Srivastava (68 y.o. Female)     Date of Birth Social Security Number Address Home Phone MRN    1950  [address]  [place] [address] [phone] [id number]    Sabianism Marital Status          Taoist        Admission Date Admission Type Admitting Provider Attending Provider Department, Room/Bed    3/28/19 Emergency Logan Ashley MD Nguyen, Minh Loc, MD [address], [address]    Discharge Date Discharge Disposition Discharge Destination                       Attending Provider:  Logan Ashley MD    Allergies:  Adhesive Tape, Ambien [Zolpidem], Strawberry Extract    Isolation:  None   Infection:  None   Code Status:  CPR    Ht:  167.6 cm (66\")   Wt:  74.6 kg (164 lb 6.4 oz)    Admission Cmt:  None   Principal Problem:  Dizzy [R42]                 Active Insurance as of 3/28/2019     Primary Coverage     Payor Plan Insurance Group Employer/Plan Group    HUMANA MEDICARE REPLACEMENT HUMANA MEDICARE REPL [id number]     Payor Plan Address Payor Plan Phone Number Payor Plan Fax Number Effective Dates    [address] [address] [phone]  1/1/2019 - None Entered    [facility] [id number]       Subscriber Name Subscriber Birth Date Member ID       EMILIA SRIVASTAVA 1950 [id number]                 Emergency Contacts      (Rel.) Home Phone Work Phone Mobile Phone    Penelope Abbasi (Friend) -- -- [phone]              "

## 2019-04-01 NOTE — PROGRESS NOTES
"                    [facility]               ASSOCIATES     LOS: 1 day     Name: Emilia Bryant  Age: 68 y.o.  Sex: female  :  1950  MRN: [id number]         Primary Care Physician: Francheska Thakur MD    Diet Regular; Cardiac    Subjective   Lightheadedness and dizziness have resolved.  However she states she has abdominal pain that started last night that is new.  She describes the pain as a \"hard ache\" that is constant.  Nothing seems to make it better or worse.  She points to her epigastric area.    Review of Systems   Respiratory: Negative for shortness of breath.    Cardiovascular: Negative for chest pain.   Gastrointestinal: Positive for abdominal pain and nausea.   Neurological: Negative for dizziness and light-headedness.      Objective   Temp:  [98 °F (36.7 °C)-98.4 °F (36.9 °C)] 98.2 °F (36.8 °C)  Heart Rate:  [54-80] 60  Resp:  [16] 16  BP: (115-140)/(50-67) 134/67  SpO2:  [92 %-98 %] 98 %  on  Flow (L/min):  [2] 2;   Device (Oxygen Therapy): nasal cannula  Body mass index is 26.53 kg/m².    Physical Exam   Constitutional: She is oriented to person, place, and time. No distress.   Cardiovascular: Normal rate and regular rhythm.   Pulmonary/Chest: Effort normal and breath sounds normal. No respiratory distress.   Abdominal: Soft. Bowel sounds are normal. She exhibits no distension. There is generalized tenderness. There is no rebound and no guarding.   Musculoskeletal: She exhibits no edema.   Neurological: She is alert and oriented to person, place, and time.   Skin: Skin is warm and dry.   Psychiatric: She has a normal mood and affect. Her behavior is normal.     Reviewed medications and new clinical results    levothyroxine 100 mcg Oral Daily   montelukast 10 mg Oral Nightly   pantoprazole 40 mg Oral QAM   phenytoin 300 mg Oral Nightly   propranolol 80 mg Oral TID   sodium chloride 3 mL Intravenous Q12H      Results from last 7 days   Lab Units 19  0450 19  0555 " 03/28/19  0432   WBC 10*3/mm3 4.95 3.96 6.92   HEMOGLOBIN g/dL 11.0* 10.6* 12.2   PLATELETS 10*3/mm3 198 188 207     Results from last 7 days   Lab Units 03/31/19  0623 03/30/19  0450 03/29/19  0555 03/28/19  0432   SODIUM mmol/L 139 139 138 128*   POTASSIUM mmol/L 3.9 4.3 3.9 4.3   CHLORIDE mmol/L 101 102 102 88*   CO2 mmol/L 28.1 27.3 26.1 27.7   BUN mg/dL 10 11 16 24*   CREATININE mg/dL 0.84 0.93 0.98 1.02*   CALCIUM mg/dL 9.1 9.2 8.6 9.3   GLUCOSE mg/dL 88 97 124* 96     Lab Results   Component Value Date    ANIONGAP 9.9 03/31/2019     Estimated Creatinine Clearance: 64.3 mL/min (by C-G formula based on SCr of 0.84 mg/dL).    Assessment/Plan   Active Hospital Problems    Diagnosis  POA   • **Dizzy [R42]  Yes   • Abdominal pain, epigastric [R10.13]  Unknown   • Nausea & vomiting [R11.2]  Unknown   • Brain aneurysm [I67.1]  Unknown   • COPD (chronic obstructive pulmonary disease) (CMS/[facility]) [J44.9]  Unknown   • Disease of thyroid gland [E07.9]  Unknown   • Legally blind [H54.8]  Unknown   • GERD (gastroesophageal reflux disease) [K21.9]  Unknown   • Seizures (CMS/[facility]) [R56.9]  Unknown      Resolved Hospital Problems   No resolved problems to display.     68 y.o. female admitted with dizziness/lightheadedness possibly due to Topamax    · Topamax has been discontinued and dizziness/lightheadedness have resolved  · Nausea and headache: Chronic  · Abdominal pain which she states is new.  Exam is unremarkable she has bowel sounds and it is soft without guarding or rebound.  There is some diffuse tenderness.  We will go ahead and check a CT and labs.  She has had some weight loss.  · SCDs  · disposition plans home with home health  · discussed with patient and nursing staff and CCP    Logan Ashley MD   04/01/19  12:48 PM

## 2019-04-01 NOTE — DISCHARGE SUMMARY
[facility]    Date of Discharge:  4/1/2019    PCP: Francheska Thakur MD    Discharge Diagnosis:   Active Hospital Problems    Diagnosis  POA   • **Dizzy [R42]  Yes   • Abdominal pain, epigastric [R10.13]  Unknown   • Nausea & vomiting [R11.2]  Unknown   • Brain aneurysm [I67.1]  Unknown   • COPD (chronic obstructive pulmonary disease) (CMS/[facility]) [J44.9]  Unknown   • Disease of thyroid gland [E07.9]  Unknown   • Legally blind [H54.8]  Unknown   • GERD (gastroesophageal reflux disease) [K21.9]  Unknown   • Seizures (CMS/[facility]) [R56.9]  Unknown      Resolved Hospital Problems   No resolved problems to display.      Consults     Date and Time Order Name Status Description    3/28/2019 1618 Inpatient Neurology Consult General      3/28/2019 0565 [facility] (on-call MD unless specified) Completed         Hospital Course  Please see history and physical for details. Patient is a 68 y.o. female with history of chronic nausea and headaches/migraines and chronic pain admitted for new dizziness/lightheadedness.  She states this started after Topamax was added and neurology felt that the dizziness/lightheadedness was a side effect of the Topamax.  It was discontinued and her lightheadedness has resolved.  She is chronic headaches and nausea and chronic pain.  This morning she had new abdominal pain but it resolved by the afternoon and a CT scan was done that showed no acute process.     She has had some weight loss.  She has a documented weight of 181 pounds December 2017.  Weight today is 164 pounds. In the summer of last year she had perineal rectosigmoid resection due to rectal prolapse.  She is asking if she can go home today and agrees to follow-up with her primary care physician regarding weight loss.    I discussed the patient's findings and my recommendations with patient and nursing staff.    Condition on Discharge: Improved.     Temp:  [98 °F (36.7 °C)-98.2 °F (36.8  °C)] 98.1 °F (36.7 °C)  Heart Rate:  [54-80] 58  Resp:  [15-16] 15  BP: (115-140)/(58-67) 130/60  Body mass index is 26.53 kg/m².    Physical Exam   Constitutional: She is oriented to person, place, and time. No distress.   Cardiovascular: Normal rate and regular rhythm.   Pulmonary/Chest: Effort normal and breath sounds normal. No respiratory distress.   Abdominal: Soft. Bowel sounds are normal. She exhibits no distension. There is generalized tenderness. There is no rebound and no guarding.   Musculoskeletal: She exhibits no edema.   Neurological: She is alert and oriented to person, place, and time.   Skin: Skin is warm and dry.   Psychiatric: She has a normal mood and affect. Her behavior is normal.        Discharge Medications      Continue These Medications      Instructions Start Date   ARTIFICIAL TEARS 1.4 % ophthalmic solution  Generic drug:  polyvinyl alcohol   No dose, route, or frequency recorded.      azelastine 0.1 % nasal spray  Commonly known as:  ASTELIN   instill 1 spray into each nostril twice a day      baclofen 10 MG tablet  Commonly known as:  LIORESAL   take 1 tablet by mouth twice a day (LATE AFTERNOON AND BEDTIME)      fluticasone 50 MCG/ACT nasal spray  Commonly known as:  FLONASE   No dose, route, or frequency recorded.      furosemide 40 MG tablet  Commonly known as:  LASIX   40 mg, 2 Times Daily      INCRUSE ELLIPTA 62.5 MCG/INH aerosol powder   Generic drug:  Umeclidinium Bromide   No dose, route, or frequency recorded.      levothyroxine 100 MCG tablet  Commonly known as:  SYNTHROID, LEVOTHROID   100 mcg, Daily      montelukast 10 MG tablet  Commonly known as:  SINGULAIR   10 mg, Nightly      omeprazole 40 MG capsule  Commonly known as:  priLOSEC   take 1 capsule by mouth twice a day      ondansetron 8 MG tablet  Commonly known as:  ZOFRAN   take 1 tablet by mouth three times a day      phenytoin 100 MG ER capsule  Commonly known as:  DILANTIN   take 3 capsules by mouth at bedtime       potassium chloride 10 MEQ CR tablet  Commonly known as:  K-DUR   10 mEq, 2 Times Daily      propranolol 80 MG tablet  Commonly known as:  INDERAL   80 mg, 3 Times Daily         Stop These Medications    amitriptyline 100 MG tablet  Commonly known as:  ELAVIL     benzoyl peroxide 5 % gel     BREO ELLIPTA 200-25 MCG/INH inhaler  Generic drug:  Fluticasone Furoate-Vilanterol     clindamycin 1 % gel  Commonly known as:  CLINDAGEL     COMBIVENT RESPIMAT  MCG/ACT inhaler  Generic drug:  ipratropium-albuterol     DULoxetine 30 MG capsule  Commonly known as:  CYMBALTA     erythromycin with ethanol 2 % gel  Commonly known as:  EMGEL     meloxicam 15 MG tablet  Commonly known as:  MOBIC     methadone 10 MG tablet  Commonly known as:  DOLOPHINE     PREMARIN 1.25 MG tablet  Generic drug:  estrogens (conjugated)     RA CETIRIZINE 10 MG tablet  Generic drug:  cetirizine     zolpidem 5 MG tablet  Commonly known as:  AMBIEN           Diet Instructions     Diet: Regular, Cardiac      Discharge Diet:   Regular  Cardiac            Activity Instructions     Activity as Tolerated           Additional Instructions for the Follow-ups that You Need to Schedule     Call MD for problems / concerns.   As directed        Follow-up Information     Francheska Thakur MD Follow up in 1 week(s).    Specialty:  Family Medicine  Contact information:  [address]  [address]  [place] [address] [phone]             Pedro Encinas MD .    Specialty:  Pain Medicine  Contact information:  [address]  [facility] [address] [phone]                  Logan Ashley MD  04/01/19  3:52 PM    Discharge time spent greater than 30 minutes.

## 2019-04-02 NOTE — PROGRESS NOTES
Case Management Discharge Note    Final Note: Home with [facility] GILBERTO hamm rn/ccp    Destination      No service has been selected for the patient.      Durable Medical Equipment      No service has been selected for the patient.      Dialysis/Infusion      No service has been selected for the patient.      Home Medical Care      Service Provider Request Status Selected Services Address Phone Number Fax Number    [facility] [address] [place] [address] [phone]      Therapy      No service has been selected for the patient.      Community Resources      No service has been selected for the patient.             Final Discharge Disposition Code: 06 - home with home health care

## 2019-04-12 ENCOUNTER — APPOINTMENT (OUTPATIENT)
Dept: GENERAL RADIOLOGY | Facility: HOSPITAL | Age: 69
End: 2019-04-12

## 2019-04-12 ENCOUNTER — APPOINTMENT (OUTPATIENT)
Dept: CT IMAGING | Facility: HOSPITAL | Age: 69
End: 2019-04-12

## 2019-04-12 ENCOUNTER — HOSPITAL ENCOUNTER (EMERGENCY)
Facility: HOSPITAL | Age: 69
Discharge: HOME | End: 2019-04-12
Attending: EMERGENCY MEDICINE | Admitting: EMERGENCY MEDICINE

## 2019-04-12 VITALS
RESPIRATION RATE: 16 BRPM | OXYGEN SATURATION: 98 % | DIASTOLIC BLOOD PRESSURE: 64 MMHG | HEART RATE: 81 BPM | HEIGHT: 66 IN | WEIGHT: 170.8 LBS | TEMPERATURE: 98.6 F | BODY MASS INDEX: 27.45 KG/M2 | SYSTOLIC BLOOD PRESSURE: 128 MMHG

## 2019-04-12 DIAGNOSIS — E87.1 HYPONATREMIA: ICD-10-CM

## 2019-04-12 DIAGNOSIS — R07.2 PRECORDIAL CHEST PAIN: Primary | ICD-10-CM

## 2019-04-12 LAB
ALBUMIN SERPL-MCNC: 4.3 G/DL (ref 3.5–5.2)
ALBUMIN/GLOB SERPL: 1.6 G/DL
ALP SERPL-CCNC: 183 U/L (ref 39–117)
ALT SERPL W P-5'-P-CCNC: 18 U/L (ref 1–33)
ANION GAP SERPL CALCULATED.3IONS-SCNC: 12.6 MMOL/L
AST SERPL-CCNC: 21 U/L (ref 1–32)
BASOPHILS # BLD AUTO: 0.05 10*3/MM3 (ref 0–0.2)
BASOPHILS NFR BLD AUTO: 0.6 % (ref 0–1.5)
BILIRUB SERPL-MCNC: 0.3 MG/DL (ref 0.2–1.2)
BUN BLD-MCNC: 16 MG/DL (ref 8–23)
BUN/CREAT SERPL: 17.2 (ref 7–25)
CALCIUM SPEC-SCNC: 9.1 MG/DL (ref 8.6–10.5)
CHLORIDE SERPL-SCNC: 86 MMOL/L (ref 98–107)
CO2 SERPL-SCNC: 31.4 MMOL/L (ref 22–29)
CREAT BLD-MCNC: 0.93 MG/DL (ref 0.57–1)
DEPRECATED RDW RBC AUTO: 42.4 FL (ref 37–54)
EOSINOPHIL # BLD AUTO: 0.14 10*3/MM3 (ref 0–0.4)
EOSINOPHIL NFR BLD AUTO: 1.7 % (ref 0.3–6.2)
ERYTHROCYTE [DISTWIDTH] IN BLOOD BY AUTOMATED COUNT: 12.6 % (ref 12.3–15.4)
GFR SERPL CREATININE-BSD FRML MDRD: 60 ML/MIN/1.73
GLOBULIN UR ELPH-MCNC: 2.7 GM/DL
GLUCOSE BLD-MCNC: 119 MG/DL (ref 65–99)
HCT VFR BLD AUTO: 37.6 % (ref 34–46.6)
HGB BLD-MCNC: 12.5 G/DL (ref 12–15.9)
IMM GRANULOCYTES # BLD AUTO: 0.03 10*3/MM3 (ref 0–0.05)
IMM GRANULOCYTES NFR BLD AUTO: 0.4 % (ref 0–0.5)
LIPASE SERPL-CCNC: 35 U/L (ref 13–60)
LYMPHOCYTES # BLD AUTO: 1.65 10*3/MM3 (ref 0.7–3.1)
LYMPHOCYTES NFR BLD AUTO: 20.3 % (ref 19.6–45.3)
MCH RBC QN AUTO: 30.4 PG (ref 26.6–33)
MCHC RBC AUTO-ENTMCNC: 33.2 G/DL (ref 31.5–35.7)
MCV RBC AUTO: 91.5 FL (ref 79–97)
MONOCYTES # BLD AUTO: 0.76 10*3/MM3 (ref 0.1–0.9)
MONOCYTES NFR BLD AUTO: 9.3 % (ref 5–12)
NEUTROPHILS # BLD AUTO: 5.51 10*3/MM3 (ref 1.4–7)
NEUTROPHILS NFR BLD AUTO: 67.7 % (ref 42.7–76)
NRBC BLD AUTO-RTO: 0 /100 WBC (ref 0–0)
NT-PROBNP SERPL-MCNC: 709.2 PG/ML (ref 5–900)
PLATELET # BLD AUTO: 236 10*3/MM3 (ref 140–450)
PMV BLD AUTO: 9 FL (ref 6–12)
POTASSIUM BLD-SCNC: 3.8 MMOL/L (ref 3.5–5.2)
PROT SERPL-MCNC: 7 G/DL (ref 6–8.5)
RBC # BLD AUTO: 4.11 10*6/MM3 (ref 3.77–5.28)
SODIUM BLD-SCNC: 130 MMOL/L (ref 136–145)
TROPONIN T SERPL-MCNC: <0.01 NG/ML (ref 0–0.03)
TROPONIN T SERPL-MCNC: <0.01 NG/ML (ref 0–0.03)
WBC NRBC COR # BLD: 8.14 10*3/MM3 (ref 3.4–10.8)

## 2019-04-12 PROCEDURE — 80053 COMPREHEN METABOLIC PANEL: CPT | Performed by: EMERGENCY MEDICINE

## 2019-04-12 PROCEDURE — 0 IOPAMIDOL PER 1 ML: Performed by: EMERGENCY MEDICINE

## 2019-04-12 PROCEDURE — 96374 THER/PROPH/DIAG INJ IV PUSH: CPT

## 2019-04-12 PROCEDURE — 25010000002 ONDANSETRON PER 1 MG: Performed by: EMERGENCY MEDICINE

## 2019-04-12 PROCEDURE — 93010 ELECTROCARDIOGRAM REPORT: CPT | Performed by: INTERNAL MEDICINE

## 2019-04-12 PROCEDURE — 83880 ASSAY OF NATRIURETIC PEPTIDE: CPT | Performed by: EMERGENCY MEDICINE

## 2019-04-12 PROCEDURE — 93005 ELECTROCARDIOGRAM TRACING: CPT | Performed by: EMERGENCY MEDICINE

## 2019-04-12 PROCEDURE — 71275 CT ANGIOGRAPHY CHEST: CPT

## 2019-04-12 PROCEDURE — 99285 EMERGENCY DEPT VISIT HI MDM: CPT

## 2019-04-12 PROCEDURE — 83690 ASSAY OF LIPASE: CPT | Performed by: EMERGENCY MEDICINE

## 2019-04-12 PROCEDURE — 85025 COMPLETE CBC W/AUTO DIFF WBC: CPT | Performed by: EMERGENCY MEDICINE

## 2019-04-12 PROCEDURE — 84484 ASSAY OF TROPONIN QUANT: CPT | Performed by: EMERGENCY MEDICINE

## 2019-04-12 PROCEDURE — 71046 X-RAY EXAM CHEST 2 VIEWS: CPT

## 2019-04-12 RX ORDER — ONDANSETRON 2 MG/ML
4 INJECTION INTRAMUSCULAR; INTRAVENOUS ONCE
Status: COMPLETED | OUTPATIENT
Start: 2019-04-12 | End: 2019-04-12

## 2019-04-12 RX ORDER — ACETAMINOPHEN 500 MG
1000 TABLET ORAL ONCE
Status: COMPLETED | OUTPATIENT
Start: 2019-04-12 | End: 2019-04-12

## 2019-04-12 RX ORDER — ONDANSETRON 4 MG/1
4 TABLET, ORALLY DISINTEGRATING ORAL EVERY 8 HOURS PRN
Qty: 15 TABLET | Refills: 0 | Status: SHIPPED | OUTPATIENT
Start: 2019-04-12 | End: 2019-04-17

## 2019-04-12 RX ORDER — SODIUM CHLORIDE 0.9 % (FLUSH) 0.9 %
10 SYRINGE (ML) INJECTION AS NEEDED
Status: DISCONTINUED | OUTPATIENT
Start: 2019-04-12 | End: 2019-04-12 | Stop reason: HOSPADM

## 2019-04-12 RX ADMIN — ACETAMINOPHEN 1000 MG: 500 TABLET, FILM COATED ORAL at 17:11

## 2019-04-12 RX ADMIN — IOPAMIDOL 150 ML: 755 INJECTION, SOLUTION INTRAVENOUS at 19:19

## 2019-04-12 RX ADMIN — NITROGLYCERIN 1 INCH: 20 OINTMENT TOPICAL at 16:37

## 2019-04-12 RX ADMIN — ONDANSETRON 4 MG: 2 INJECTION INTRAMUSCULAR; INTRAVENOUS at 20:26

## 2019-04-12 RX ADMIN — SODIUM CHLORIDE 500 ML: 9 INJECTION, SOLUTION INTRAVENOUS at 19:13

## 2019-04-12 NOTE — ED PROVIDER NOTES
EMERGENCY DEPARTMENT ENCOUNTER    Room Number:  24/24  Date seen:  4/13/2019  Time seen: 3:35 PM  PCP: Francheska Thakur MD  Historian: Pt      HPI:  Chief Complaint: Chest Pain  A complete HPI/ROS/PMH/PSH/SH/FH are unobtainable due to: none  Context: Emilia Bryant is a 68 y.o. female who presents to the ED c/o retro-sternum chest pain that began this morning after getting up at 0600, progressively worsening throughout the day and currently moderate. Pain radiates up into neck. Pt states pain is currently improved after receiving 3 NTG and full strength ASA. Pt also c/o SOA and N/V. Pt denies fever, chills, or cough. Pt reports Hx of stage 3 kidney failure and brain aneurysms. Pt is on ASA. Pt denies HTN, HLD, or DM. Pt denies smoking. FHx of brothers and father with heart disease. (brother with MI in 30s).     Pain Location: retro-sternum  Radiation: none  Quality: pain  Intensity/Severity: moderate  Duration: 10 hours  Onset quality: gradual  Timing: constant  Progression: progressively worsening, better after 3 NTG and full strength ASA  Aggravating Factors: none  Alleviating Factors: none  Previous Episodes: none  Treatment before arrival: Pt has received 3 NTG and full strength ASA  Associated Symptoms: SOA and N/V    PAST MEDICAL HISTORY  Active Ambulatory Problems     Diagnosis Date Noted   • Dizzy 03/28/2019   • Visual changes 03/28/2019   • Generalized weakness 03/28/2019   • Acute nonintractable headache 03/28/2019   • Arthritis 03/28/2019   • Brain aneurysm 03/28/2019   • COPD (chronic obstructive pulmonary disease) (CMS/[facility]) 03/28/2019   • Disease of thyroid gland 03/28/2019   • Legally blind 03/28/2019   • GERD (gastroesophageal reflux disease) 03/28/2019   • Migraine 03/28/2019   • Seizures (CMS/[facility]) 03/28/2019   • Nausea & vomiting 03/30/2019   • Abdominal pain, epigastric 04/01/2019     Resolved Ambulatory Problems     Diagnosis Date Noted   • No Resolved Ambulatory Problems     Past Medical  History:   Diagnosis Date   • Arthritis    • Asthma    • Brain aneurysm    • CHF (congestive heart failure) (CMS/HCC)    • COPD (chronic obstructive pulmonary disease) (CMS/HCC)    • Disease of thyroid gland    • GERD (gastroesophageal reflux disease)    • Legally blind    • Migraine    • Seizures (CMS/HCC)    • Stroke (CMS/HCC)          PAST SURGICAL HISTORY  Past Surgical History:   Procedure Laterality Date   • CEREBRAL ANEURYSM REPAIR     •  SECTION     • CHOLECYSTECTOMY     • CRANIOTOMY     • HEMORRHOIDECTOMY     • HERNIA REPAIR     • HYSTERECTOMY     • WRIST SURGERY           FAMILY HISTORY  Family History   Problem Relation Age of Onset   • Breast cancer Mother    • Colon cancer Cousin    • Colon cancer Maternal Aunt    • Colon cancer Maternal Uncle          SOCIAL HISTORY  Social History     Socioeconomic History   • Marital status:      Spouse name: Not on file   • Number of children: Not on file   • Years of education: Not on file   • Highest education level: Not on file   Tobacco Use   • Smoking status: Never Smoker   • Smokeless tobacco: Never Used   Substance and Sexual Activity   • Alcohol use: No   • Drug use: No   • Sexual activity: Defer         ALLERGIES  Adhesive tape; Ambien [zolpidem]; Strawberry extract; and Topiramate        REVIEW OF SYSTEMS  Review of Systems   Constitutional: Negative for fever.   HENT: Negative for sore throat.    Eyes: Negative.    Respiratory: Positive for shortness of breath. Negative for cough.    Cardiovascular: Positive for chest pain (began at 0600 after waking up, progressively worsening).   Gastrointestinal: Positive for nausea and vomiting. Negative for abdominal pain and diarrhea.   Genitourinary: Negative for dysuria.   Musculoskeletal: Negative for neck pain.   Skin: Negative for rash.   Neurological: Negative for weakness, numbness and headaches.   Hematological: Negative.    Psychiatric/Behavioral: Negative.    All other systems reviewed and  are negative.           PHYSICAL EXAM  ED Triage Vitals [04/12/19 1521]   Temp Heart Rate Resp BP SpO2   98.6 °F (37 °C) 74 12 115/68 98 %      Temp src Heart Rate Source Patient Position BP Location FiO2 (%)   Tympanic -- -- -- --         GENERAL: not distressed  HENT: nares patent  EYES: no scleral icterus  CV: regular rhythm, regular rate  RESPIRATORY: normal effort, CTAB, no chest wall tenderness  ABDOMEN: soft, nontender  MUSCULOSKELETAL: no deformity  NEURO: alert, CARR, FC  SKIN: warm, dry    Vital signs and nursing notes reviewed.          LAB RESULTS  Recent Results (from the past 24 hour(s))   Comprehensive Metabolic Panel    Collection Time: 04/12/19  4:24 PM   Result Value Ref Range    Glucose 119 (H) 65 - 99 mg/dL    BUN 16 8 - 23 mg/dL    Creatinine 0.93 0.57 - 1.00 mg/dL    Sodium 130 (L) 136 - 145 mmol/L    Potassium 3.8 3.5 - 5.2 mmol/L    Chloride 86 (L) 98 - 107 mmol/L    CO2 31.4 (H) 22.0 - 29.0 mmol/L    Calcium 9.1 8.6 - 10.5 mg/dL    Total Protein 7.0 6.0 - 8.5 g/dL    Albumin 4.30 3.50 - 5.20 g/dL    ALT (SGPT) 18 1 - 33 U/L    AST (SGOT) 21 1 - 32 U/L    Alkaline Phosphatase 183 (H) 39 - 117 U/L    Total Bilirubin 0.3 0.2 - 1.2 mg/dL    eGFR Non African Amer 60 (L) >60 mL/min/1.73    Globulin 2.7 gm/dL    A/G Ratio 1.6 g/dL    BUN/Creatinine Ratio 17.2 7.0 - 25.0    Anion Gap 12.6 mmol/L   Lipase    Collection Time: 04/12/19  4:24 PM   Result Value Ref Range    Lipase 35 13 - 60 U/L   Troponin    Collection Time: 04/12/19  4:24 PM   Result Value Ref Range    Troponin T <0.010 0.000 - 0.030 ng/mL   BNP    Collection Time: 04/12/19  4:24 PM   Result Value Ref Range    proBNP 709.2 5.0 - 900.0 pg/mL   CBC Auto Differential    Collection Time: 04/12/19  4:24 PM   Result Value Ref Range    WBC 8.14 3.40 - 10.80 10*3/mm3    RBC 4.11 3.77 - 5.28 10*6/mm3    Hemoglobin 12.5 12.0 - 15.9 g/dL    Hematocrit 37.6 34.0 - 46.6 %    MCV 91.5 79.0 - 97.0 fL    MCH 30.4 26.6 - 33.0 pg    MCHC 33.2 31.5 - 35.7  g/dL    RDW 12.6 12.3 - 15.4 %    RDW-SD 42.4 37.0 - 54.0 fl    MPV 9.0 6.0 - 12.0 fL    Platelets 236 140 - 450 10*3/mm3    Neutrophil % 67.7 42.7 - 76.0 %    Lymphocyte % 20.3 19.6 - 45.3 %    Monocyte % 9.3 5.0 - 12.0 %    Eosinophil % 1.7 0.3 - 6.2 %    Basophil % 0.6 0.0 - 1.5 %    Immature Grans % 0.4 0.0 - 0.5 %    Neutrophils, Absolute 5.51 1.40 - 7.00 10*3/mm3    Lymphocytes, Absolute 1.65 0.70 - 3.10 10*3/mm3    Monocytes, Absolute 0.76 0.10 - 0.90 10*3/mm3    Eosinophils, Absolute 0.14 0.00 - 0.40 10*3/mm3    Basophils, Absolute 0.05 0.00 - 0.20 10*3/mm3    Immature Grans, Absolute 0.03 0.00 - 0.05 10*3/mm3    nRBC 0.0 0.0 - 0.0 /100 WBC   Troponin    Collection Time: 04/12/19  7:15 PM   Result Value Ref Range    Troponin T <0.010 0.000 - 0.030 ng/mL       Ordered the above labs and reviewed the results.        RADIOLOGY  CT Angiogram Chest With Contrast   Final Result           1. No pulmonary embolism.   2. Moderate hiatal hernia. Mild nonspecific distal esophageal wall   thickening, further evaluation could be considered.       Discussed by telephone with Chandrakant Meyers, 04/12/2019.       This report was finalized on 4/12/2019 7:36 PM by Dr. Angel Benitez M.D.          XR Chest 2 View   Final Result   No focal pulmonary consolidation. Follow-up as clinical   indications persist.       This report was finalized on 4/12/2019 4:18 PM by Dr. Angel Benitez M.D.                   PROCEDURES  Procedures        EKG:           EKG time: 1534  Rhythm/Rate: NSR, 62  P waves and MS: Nml  QRS, axis: low voltage QRS, LAD  ST and T waves: Normal     Interpreted Contemporaneously by me, independently viewed  Low voltage new compared to prior 3/20/2019      HEART SCORE    History Moderately suspicious (1)  ECG Normal (0)  Age > or = 65 (2)  Risk factors 1 or 2 (1)  Troponin < or = Normal limit (0)    This patient's HEART score is 4    HEART Score Key:  Scores 0-3: 0.9-1.7% risk of adverse cardiac  event. In the HEART Score study, these patients were discharged (0.99% in the retrospective study, 1.7% in the prospective study)  Scores 4-6: 12-16.6% risk of adverse cardiac event. In the HEART Score study, these patients were admitted to the hospital. (11.6% retrospective, 16.6% prospective)  Scores ?7: 50-65% risk of adverse cardiac event. In the HEART Score study, these patients were candidates for early invasive measures. (65.2% retrospective, 50.1% prospective)          MEDICATIONS GIVEN IN ER  Medications   nitroglycerin (NITROSTAT) ointment 1 inch (1 inch Topical Given 4/12/19 1637)   acetaminophen (TYLENOL) tablet 1,000 mg (1,000 mg Oral Given 4/12/19 1711)   sodium chloride 0.9 % bolus 500 mL (0 mL Intravenous Stopped 4/12/19 2042)   iopamidol (ISOVUE-370) 76 % injection 150 mL (150 mL Intravenous Given by Other 4/12/19 1919)   ondansetron (ZOFRAN) injection 4 mg (4 mg Intravenous Given 4/12/19 2026)                   PROGRESS AND CONSULTS  ED Course as of Apr 13 0932   Fri Apr 12, 2019   1605 She presents with chest pain that has a mixture of typical and atypical elements.  It is retrosternal pain that goes up to her neck but it has been constant now for approximately 10 hours.  Differential also includes ACS, pulmonary embolism, pneumonia, pneumothorax.  Patient appears clinically very well.  I do have low suspicion for any emergent pathology.  Additionally, I do see the patient has a long-standing history of chronic pain and I do wonder if this could be contributing to her presentation today.  [TD]      ED Course User Index  [TD] Thomas Lerner II, MD           MEDICAL DECISION MAKING      MDM  Number of Diagnoses or Management Options  Hyponatremia:   Precordial chest pain:   Diagnosis management comments: DDx includes ACS, PE, PNA, TAD, PTX, MSK pain, dyspepsia, anxiety, myocarditis/pericarditis, esophageal rupture, pancreatitis.      ECG shows no new ischemic changes.  Troponin negative x 2.      CT scan shows no evidence of pulmonary embolism.  I did discuss with the patient the distal esophagus changes and recommended GI follow-up with her.  Patient verbalized understanding and agreement.  Given patient's elevated heart score, I recommended admission.  However, patient adamantly did not want to be admitted.  Instead she wants to follow-up with cardiology.  I believe this is clinically reasonable given that she has had 2 negative troponins in the setting of constant chest pain.  I have low pretest probability for ACS despite some more typical symptoms, namely her chest pain radiating up to her left neck       Amount and/or Complexity of Data Reviewed  Clinical lab tests: ordered and reviewed (Troponin negative x2)  Tests in the radiology section of CPT®: ordered and reviewed (No PE)  Tests in the medicine section of CPT®: ordered and reviewed (See ekg note)  Decide to obtain previous medical records or to obtain history from someone other than the patient: yes  Review and summarize past medical records: (Reviewed old records from [facility] and [place]. I see no Hx of CAD. )  Independent visualization of images, tracings, or specimens: yes (No PE)               DIAGNOSIS  Final diagnoses:   Precordial chest pain   Hyponatremia         DISPOSITION  discharge            Latest Documented Vital Signs:  As of 9:32 AM  BP- 128/64 HR- 81 Temp- 98.6 °F (37 °C) (Tympanic) O2 sat- 98%        --  Documentation assistance provided by nigel Boyle for Dr. Yann MD.  Information recorded by the scribe was done at my direction and has been verified and validated by me.                   Dameon Boyle  04/12/19 0406       Thomas Lerner II, MD  04/13/19 4423

## 2019-04-24 ENCOUNTER — APPOINTMENT (OUTPATIENT)
Dept: GENERAL RADIOLOGY | Facility: HOSPITAL | Age: 69
End: 2019-04-24

## 2019-04-24 ENCOUNTER — HOSPITAL ENCOUNTER (EMERGENCY)
Facility: HOSPITAL | Age: 69
Discharge: HOME | End: 2019-04-24
Attending: EMERGENCY MEDICINE | Admitting: EMERGENCY MEDICINE

## 2019-04-24 VITALS
OXYGEN SATURATION: 98 % | TEMPERATURE: 98 F | RESPIRATION RATE: 16 BRPM | WEIGHT: 159 LBS | BODY MASS INDEX: 25.55 KG/M2 | HEART RATE: 82 BPM | SYSTOLIC BLOOD PRESSURE: 121 MMHG | HEIGHT: 66 IN | DIASTOLIC BLOOD PRESSURE: 78 MMHG

## 2019-04-24 DIAGNOSIS — R07.9 CHEST PAIN IN ADULT: Primary | ICD-10-CM

## 2019-04-24 LAB
ALBUMIN SERPL-MCNC: 3.7 G/DL (ref 3.5–5.2)
ALBUMIN/GLOB SERPL: 1.1 G/DL
ALP SERPL-CCNC: 174 U/L (ref 39–117)
ALT SERPL W P-5'-P-CCNC: 15 U/L (ref 1–33)
ANION GAP SERPL CALCULATED.3IONS-SCNC: 11.6 MMOL/L
AST SERPL-CCNC: 25 U/L (ref 1–32)
BASOPHILS # BLD AUTO: 0.03 10*3/MM3 (ref 0–0.2)
BASOPHILS NFR BLD AUTO: 0.4 % (ref 0–1.5)
BILIRUB SERPL-MCNC: <0.2 MG/DL (ref 0.2–1.2)
BUN BLD-MCNC: 19 MG/DL (ref 8–23)
BUN/CREAT SERPL: 18.6 (ref 7–25)
CALCIUM SPEC-SCNC: 9.7 MG/DL (ref 8.6–10.5)
CHLORIDE SERPL-SCNC: 95 MMOL/L (ref 98–107)
CO2 SERPL-SCNC: 25.4 MMOL/L (ref 22–29)
CREAT BLD-MCNC: 1.02 MG/DL (ref 0.57–1)
DEPRECATED RDW RBC AUTO: 45.1 FL (ref 37–54)
EOSINOPHIL # BLD AUTO: 0.15 10*3/MM3 (ref 0–0.4)
EOSINOPHIL NFR BLD AUTO: 1.9 % (ref 0.3–6.2)
ERYTHROCYTE [DISTWIDTH] IN BLOOD BY AUTOMATED COUNT: 13.2 % (ref 12.3–15.4)
GFR SERPL CREATININE-BSD FRML MDRD: 54 ML/MIN/1.73
GLOBULIN UR ELPH-MCNC: 3.3 GM/DL
GLUCOSE BLD-MCNC: 110 MG/DL (ref 65–99)
HCT VFR BLD AUTO: 34.4 % (ref 34–46.6)
HGB BLD-MCNC: 11.2 G/DL (ref 12–15.9)
HOLD SPECIMEN: NORMAL
IMM GRANULOCYTES # BLD AUTO: 0.02 10*3/MM3 (ref 0–0.05)
IMM GRANULOCYTES NFR BLD AUTO: 0.3 % (ref 0–0.5)
LYMPHOCYTES # BLD AUTO: 1.76 10*3/MM3 (ref 0.7–3.1)
LYMPHOCYTES NFR BLD AUTO: 22.8 % (ref 19.6–45.3)
MCH RBC QN AUTO: 30.4 PG (ref 26.6–33)
MCHC RBC AUTO-ENTMCNC: 32.6 G/DL (ref 31.5–35.7)
MCV RBC AUTO: 93.2 FL (ref 79–97)
MONOCYTES # BLD AUTO: 0.62 10*3/MM3 (ref 0.1–0.9)
MONOCYTES NFR BLD AUTO: 8 % (ref 5–12)
NEUTROPHILS # BLD AUTO: 5.15 10*3/MM3 (ref 1.7–7)
NEUTROPHILS NFR BLD AUTO: 66.6 % (ref 42.7–76)
NRBC BLD AUTO-RTO: 0 /100 WBC (ref 0–0.2)
PLATELET # BLD AUTO: 221 10*3/MM3 (ref 140–450)
PMV BLD AUTO: 8.9 FL (ref 6–12)
POTASSIUM BLD-SCNC: 4.8 MMOL/L (ref 3.5–5.2)
PROT SERPL-MCNC: 7 G/DL (ref 6–8.5)
RBC # BLD AUTO: 3.69 10*6/MM3 (ref 3.77–5.28)
SODIUM BLD-SCNC: 132 MMOL/L (ref 136–145)
TROPONIN T SERPL-MCNC: <0.01 NG/ML (ref 0–0.03)
TROPONIN T SERPL-MCNC: <0.01 NG/ML (ref 0–0.03)
WBC NRBC COR # BLD: 7.73 10*3/MM3 (ref 3.4–10.8)
WHOLE BLOOD HOLD SPECIMEN: NORMAL

## 2019-04-24 PROCEDURE — 96375 TX/PRO/DX INJ NEW DRUG ADDON: CPT

## 2019-04-24 PROCEDURE — 99285 EMERGENCY DEPT VISIT HI MDM: CPT

## 2019-04-24 PROCEDURE — 93005 ELECTROCARDIOGRAM TRACING: CPT | Performed by: EMERGENCY MEDICINE

## 2019-04-24 PROCEDURE — 25010000002 ONDANSETRON PER 1 MG: Performed by: EMERGENCY MEDICINE

## 2019-04-24 PROCEDURE — 25010000002 DIPHENHYDRAMINE PER 50 MG: Performed by: EMERGENCY MEDICINE

## 2019-04-24 PROCEDURE — 80053 COMPREHEN METABOLIC PANEL: CPT | Performed by: EMERGENCY MEDICINE

## 2019-04-24 PROCEDURE — 84484 ASSAY OF TROPONIN QUANT: CPT | Performed by: EMERGENCY MEDICINE

## 2019-04-24 PROCEDURE — 71045 X-RAY EXAM CHEST 1 VIEW: CPT

## 2019-04-24 PROCEDURE — 85025 COMPLETE CBC W/AUTO DIFF WBC: CPT | Performed by: EMERGENCY MEDICINE

## 2019-04-24 PROCEDURE — 25010000002 KETOROLAC TROMETHAMINE PER 15 MG: Performed by: EMERGENCY MEDICINE

## 2019-04-24 PROCEDURE — 25010000002 PROCHLORPERAZINE EDISYLATE PER 10 MG: Performed by: EMERGENCY MEDICINE

## 2019-04-24 PROCEDURE — 93005 ELECTROCARDIOGRAM TRACING: CPT

## 2019-04-24 PROCEDURE — 96374 THER/PROPH/DIAG INJ IV PUSH: CPT

## 2019-04-24 RX ORDER — ALUMINA, MAGNESIA, AND SIMETHICONE 2400; 2400; 240 MG/30ML; MG/30ML; MG/30ML
15 SUSPENSION ORAL ONCE
Status: COMPLETED | OUTPATIENT
Start: 2019-04-24 | End: 2019-04-24

## 2019-04-24 RX ORDER — ONDANSETRON 2 MG/ML
4 INJECTION INTRAMUSCULAR; INTRAVENOUS ONCE
Status: COMPLETED | OUTPATIENT
Start: 2019-04-24 | End: 2019-04-24

## 2019-04-24 RX ORDER — KETOROLAC TROMETHAMINE 15 MG/ML
10 INJECTION, SOLUTION INTRAMUSCULAR; INTRAVENOUS ONCE
Status: COMPLETED | OUTPATIENT
Start: 2019-04-24 | End: 2019-04-24

## 2019-04-24 RX ORDER — DIPHENHYDRAMINE HYDROCHLORIDE 50 MG/ML
25 INJECTION INTRAMUSCULAR; INTRAVENOUS ONCE
Status: COMPLETED | OUTPATIENT
Start: 2019-04-24 | End: 2019-04-24

## 2019-04-24 RX ADMIN — KETOROLAC TROMETHAMINE 10 MG: 15 INJECTION, SOLUTION INTRAMUSCULAR; INTRAVENOUS at 20:34

## 2019-04-24 RX ADMIN — LIDOCAINE HYDROCHLORIDE 15 ML: 20 SOLUTION ORAL; TOPICAL at 20:42

## 2019-04-24 RX ADMIN — ONDANSETRON HYDROCHLORIDE 4 MG: 2 SOLUTION INTRAMUSCULAR; INTRAVENOUS at 19:11

## 2019-04-24 RX ADMIN — ALUMINUM HYDROXIDE, MAGNESIUM HYDROXIDE, AND DIMETHICONE 15 ML: 400; 400; 40 SUSPENSION ORAL at 20:42

## 2019-04-24 RX ADMIN — PROCHLORPERAZINE EDISYLATE 5 MG: 5 INJECTION INTRAMUSCULAR; INTRAVENOUS at 20:36

## 2019-04-24 RX ADMIN — DIPHENHYDRAMINE HYDROCHLORIDE 25 MG: 50 INJECTION, SOLUTION INTRAMUSCULAR; INTRAVENOUS at 20:31

## 2019-06-02 ENCOUNTER — APPOINTMENT (OUTPATIENT)
Dept: GENERAL RADIOLOGY | Facility: HOSPITAL | Age: 69
End: 2019-06-02

## 2019-06-02 ENCOUNTER — HOSPITAL ENCOUNTER (INPATIENT)
Facility: HOSPITAL | Age: 69
LOS: 3 days | Discharge: HOME HEALTH | End: 2019-06-06
Attending: EMERGENCY MEDICINE | Admitting: INTERNAL MEDICINE

## 2019-06-02 DIAGNOSIS — R10.13 ABDOMINAL PAIN, EPIGASTRIC: ICD-10-CM

## 2019-06-02 DIAGNOSIS — Z74.09 IMPAIRED FUNCTIONAL MOBILITY, BALANCE, GAIT, AND ENDURANCE: ICD-10-CM

## 2019-06-02 DIAGNOSIS — R11.2 NON-INTRACTABLE VOMITING WITH NAUSEA, UNSPECIFIED VOMITING TYPE: ICD-10-CM

## 2019-06-02 DIAGNOSIS — R07.2 PRECORDIAL PAIN: Primary | ICD-10-CM

## 2019-06-02 LAB
BASOPHILS # BLD AUTO: 0.03 10*3/MM3 (ref 0–0.2)
BASOPHILS NFR BLD AUTO: 0.5 % (ref 0–1.5)
DEPRECATED RDW RBC AUTO: 42.5 FL (ref 37–54)
EOSINOPHIL # BLD AUTO: 0.12 10*3/MM3 (ref 0–0.4)
EOSINOPHIL NFR BLD AUTO: 2.2 % (ref 0.3–6.2)
ERYTHROCYTE [DISTWIDTH] IN BLOOD BY AUTOMATED COUNT: 12.6 % (ref 12.3–15.4)
HCT VFR BLD AUTO: 33.5 % (ref 34–46.6)
HGB BLD-MCNC: 10.8 G/DL (ref 12–15.9)
HOLD SPECIMEN: NORMAL
IMM GRANULOCYTES # BLD AUTO: 0.02 10*3/MM3 (ref 0–0.05)
IMM GRANULOCYTES NFR BLD AUTO: 0.4 % (ref 0–0.5)
LYMPHOCYTES # BLD AUTO: 1.09 10*3/MM3 (ref 0.7–3.1)
LYMPHOCYTES NFR BLD AUTO: 20 % (ref 19.6–45.3)
MCH RBC QN AUTO: 29.5 PG (ref 26.6–33)
MCHC RBC AUTO-ENTMCNC: 32.2 G/DL (ref 31.5–35.7)
MCV RBC AUTO: 91.5 FL (ref 79–97)
MONOCYTES # BLD AUTO: 0.49 10*3/MM3 (ref 0.1–0.9)
MONOCYTES NFR BLD AUTO: 9 % (ref 5–12)
NEUTROPHILS # BLD AUTO: 3.71 10*3/MM3 (ref 1.7–7)
NEUTROPHILS NFR BLD AUTO: 67.9 % (ref 42.7–76)
NRBC BLD AUTO-RTO: 0 /100 WBC (ref 0–0.2)
PLATELET # BLD AUTO: 259 10*3/MM3 (ref 140–450)
PMV BLD AUTO: 8.8 FL (ref 6–12)
RBC # BLD AUTO: 3.66 10*6/MM3 (ref 3.77–5.28)
TROPONIN T SERPL-MCNC: <0.01 NG/ML (ref 0–0.03)
WBC NRBC COR # BLD: 5.46 10*3/MM3 (ref 3.4–10.8)
WHOLE BLOOD HOLD SPECIMEN: NORMAL
WHOLE BLOOD HOLD SPECIMEN: NORMAL

## 2019-06-02 PROCEDURE — 93005 ELECTROCARDIOGRAM TRACING: CPT | Performed by: EMERGENCY MEDICINE

## 2019-06-02 PROCEDURE — 93010 ELECTROCARDIOGRAM REPORT: CPT | Performed by: INTERNAL MEDICINE

## 2019-06-02 PROCEDURE — 80053 COMPREHEN METABOLIC PANEL: CPT | Performed by: EMERGENCY MEDICINE

## 2019-06-02 PROCEDURE — 71046 X-RAY EXAM CHEST 2 VIEWS: CPT

## 2019-06-02 PROCEDURE — 85025 COMPLETE CBC W/AUTO DIFF WBC: CPT | Performed by: EMERGENCY MEDICINE

## 2019-06-02 PROCEDURE — 84484 ASSAY OF TROPONIN QUANT: CPT | Performed by: EMERGENCY MEDICINE

## 2019-06-02 PROCEDURE — 99285 EMERGENCY DEPT VISIT HI MDM: CPT

## 2019-06-02 RX ORDER — ASPIRIN 325 MG
325 TABLET ORAL ONCE
Status: DISCONTINUED | OUTPATIENT
Start: 2019-06-02 | End: 2019-06-03

## 2019-06-02 RX ORDER — MORPHINE SULFATE 2 MG/ML
4 INJECTION, SOLUTION INTRAMUSCULAR; INTRAVENOUS ONCE
Status: COMPLETED | OUTPATIENT
Start: 2019-06-02 | End: 2019-06-03

## 2019-06-02 RX ORDER — ONDANSETRON 2 MG/ML
4 INJECTION INTRAMUSCULAR; INTRAVENOUS ONCE
Status: COMPLETED | OUTPATIENT
Start: 2019-06-02 | End: 2019-06-03

## 2019-06-02 RX ORDER — SODIUM CHLORIDE 0.9 % (FLUSH) 0.9 %
10 SYRINGE (ML) INJECTION AS NEEDED
Status: DISCONTINUED | OUTPATIENT
Start: 2019-06-02 | End: 2019-06-06 | Stop reason: HOSPADM

## 2019-06-03 ENCOUNTER — APPOINTMENT (OUTPATIENT)
Dept: CT IMAGING | Facility: HOSPITAL | Age: 69
End: 2019-06-03

## 2019-06-03 ENCOUNTER — APPOINTMENT (OUTPATIENT)
Dept: GENERAL RADIOLOGY | Facility: HOSPITAL | Age: 69
End: 2019-06-03

## 2019-06-03 PROBLEM — R07.2 PRECORDIAL PAIN: Status: ACTIVE | Noted: 2019-06-03

## 2019-06-03 LAB
ALBUMIN SERPL-MCNC: 3.5 G/DL (ref 3.5–5.2)
ALBUMIN/GLOB SERPL: 1.3 G/DL
ALP SERPL-CCNC: 140 U/L (ref 39–117)
ALT SERPL W P-5'-P-CCNC: 8 U/L (ref 1–33)
ANION GAP SERPL CALCULATED.3IONS-SCNC: 12.8 MMOL/L
ANION GAP SERPL CALCULATED.3IONS-SCNC: 16.4 MMOL/L
AST SERPL-CCNC: 14 U/L (ref 1–32)
BILIRUB SERPL-MCNC: <0.2 MG/DL (ref 0.2–1.2)
BUN BLD-MCNC: 15 MG/DL (ref 8–23)
BUN BLD-MCNC: 18 MG/DL (ref 8–23)
BUN/CREAT SERPL: 14.3 (ref 7–25)
BUN/CREAT SERPL: 16.2 (ref 7–25)
CALCIUM SPEC-SCNC: 8.1 MG/DL (ref 8.6–10.5)
CALCIUM SPEC-SCNC: 8.8 MG/DL (ref 8.6–10.5)
CHLORIDE SERPL-SCNC: 95 MMOL/L (ref 98–107)
CHLORIDE SERPL-SCNC: 95 MMOL/L (ref 98–107)
CO2 SERPL-SCNC: 21.6 MMOL/L (ref 22–29)
CO2 SERPL-SCNC: 24.2 MMOL/L (ref 22–29)
CREAT BLD-MCNC: 1.05 MG/DL (ref 0.57–1)
CREAT BLD-MCNC: 1.11 MG/DL (ref 0.57–1)
GFR SERPL CREATININE-BSD FRML MDRD: 49 ML/MIN/1.73
GFR SERPL CREATININE-BSD FRML MDRD: 52 ML/MIN/1.73
GLOBULIN UR ELPH-MCNC: 2.6 GM/DL
GLUCOSE BLD-MCNC: 114 MG/DL (ref 65–99)
GLUCOSE BLD-MCNC: 122 MG/DL (ref 65–99)
HOLD SPECIMEN: NORMAL
POTASSIUM BLD-SCNC: 3.9 MMOL/L (ref 3.5–5.2)
POTASSIUM BLD-SCNC: 4 MMOL/L (ref 3.5–5.2)
PROT SERPL-MCNC: 6.1 G/DL (ref 6–8.5)
SODIUM BLD-SCNC: 132 MMOL/L (ref 136–145)
SODIUM BLD-SCNC: 133 MMOL/L (ref 136–145)
TROPONIN T SERPL-MCNC: <0.01 NG/ML (ref 0–0.03)
TROPONIN T SERPL-MCNC: <0.01 NG/ML (ref 0–0.03)

## 2019-06-03 PROCEDURE — 25010000002 ONDANSETRON PER 1 MG: Performed by: EMERGENCY MEDICINE

## 2019-06-03 PROCEDURE — 25010000002 ONDANSETRON PER 1 MG: Performed by: NURSE PRACTITIONER

## 2019-06-03 PROCEDURE — 25010000002 MORPHINE PER 10 MG: Performed by: EMERGENCY MEDICINE

## 2019-06-03 PROCEDURE — 70450 CT HEAD/BRAIN W/O DYE: CPT

## 2019-06-03 PROCEDURE — 94799 UNLISTED PULMONARY SVC/PX: CPT

## 2019-06-03 PROCEDURE — 25810000003 SODIUM CHLORIDE 0.9 % WITH KCL 20 MEQ 20-0.9 MEQ/L-% SOLUTION: Performed by: HOSPITALIST

## 2019-06-03 PROCEDURE — 71275 CT ANGIOGRAPHY CHEST: CPT

## 2019-06-03 PROCEDURE — 25010000002 ONDANSETRON PER 1 MG: Performed by: HOSPITALIST

## 2019-06-03 PROCEDURE — 74018 RADEX ABDOMEN 1 VIEW: CPT

## 2019-06-03 PROCEDURE — 84484 ASSAY OF TROPONIN QUANT: CPT | Performed by: EMERGENCY MEDICINE

## 2019-06-03 PROCEDURE — 93005 ELECTROCARDIOGRAM TRACING: CPT | Performed by: INTERNAL MEDICINE

## 2019-06-03 PROCEDURE — 74176 CT ABD & PELVIS W/O CONTRAST: CPT

## 2019-06-03 PROCEDURE — 80048 BASIC METABOLIC PNL TOTAL CA: CPT | Performed by: NURSE PRACTITIONER

## 2019-06-03 PROCEDURE — 0 IOPAMIDOL PER 1 ML: Performed by: EMERGENCY MEDICINE

## 2019-06-03 PROCEDURE — 94640 AIRWAY INHALATION TREATMENT: CPT

## 2019-06-03 PROCEDURE — 25010000002 MORPHINE PER 10 MG: Performed by: NURSE PRACTITIONER

## 2019-06-03 PROCEDURE — 93010 ELECTROCARDIOGRAM REPORT: CPT | Performed by: INTERNAL MEDICINE

## 2019-06-03 RX ORDER — ACETAMINOPHEN 650 MG/1
650 SUPPOSITORY RECTAL EVERY 4 HOURS PRN
Status: DISCONTINUED | OUTPATIENT
Start: 2019-06-03 | End: 2019-06-06 | Stop reason: HOSPADM

## 2019-06-03 RX ORDER — AZITHROMYCIN 250 MG/1
250 TABLET, FILM COATED ORAL DAILY
COMMUNITY
Start: 2019-05-28 | End: 2019-06-06 | Stop reason: HOSPADM

## 2019-06-03 RX ORDER — GUAIFENESIN 600 MG/1
600 TABLET, EXTENDED RELEASE ORAL EVERY 12 HOURS SCHEDULED
Status: DISCONTINUED | OUTPATIENT
Start: 2019-06-03 | End: 2019-06-06 | Stop reason: HOSPADM

## 2019-06-03 RX ORDER — IPRATROPIUM BROMIDE AND ALBUTEROL SULFATE 2.5; .5 MG/3ML; MG/3ML
3 SOLUTION RESPIRATORY (INHALATION)
Status: DISCONTINUED | OUTPATIENT
Start: 2019-06-03 | End: 2019-06-06 | Stop reason: HOSPADM

## 2019-06-03 RX ORDER — FUROSEMIDE 40 MG/1
40 TABLET ORAL 2 TIMES DAILY
Status: DISCONTINUED | OUTPATIENT
Start: 2019-06-03 | End: 2019-06-03

## 2019-06-03 RX ORDER — DOCUSATE SODIUM 100 MG/1
200 CAPSULE, LIQUID FILLED ORAL 2 TIMES DAILY
Status: DISCONTINUED | OUTPATIENT
Start: 2019-06-03 | End: 2019-06-06 | Stop reason: HOSPADM

## 2019-06-03 RX ORDER — PHENYTOIN SODIUM 100 MG/1
300 CAPSULE, EXTENDED RELEASE ORAL ONCE
Status: COMPLETED | OUTPATIENT
Start: 2019-06-03 | End: 2019-06-03

## 2019-06-03 RX ORDER — PHENYTOIN SODIUM 100 MG/1
300 CAPSULE, EXTENDED RELEASE ORAL DAILY
Status: DISCONTINUED | OUTPATIENT
Start: 2019-06-03 | End: 2019-06-06 | Stop reason: HOSPADM

## 2019-06-03 RX ORDER — PRAMIPEXOLE DIHYDROCHLORIDE 0.25 MG/1
0.25 TABLET ORAL NIGHTLY PRN
COMMUNITY

## 2019-06-03 RX ORDER — SODIUM CHLORIDE 9 MG/ML
125 INJECTION, SOLUTION INTRAVENOUS CONTINUOUS
Status: DISCONTINUED | OUTPATIENT
Start: 2019-06-03 | End: 2019-06-03

## 2019-06-03 RX ORDER — BACLOFEN 10 MG/1
10 TABLET ORAL EVERY 12 HOURS SCHEDULED
Status: DISCONTINUED | OUTPATIENT
Start: 2019-06-03 | End: 2019-06-03

## 2019-06-03 RX ORDER — UREA 10 %
2 LOTION (ML) TOPICAL NIGHTLY PRN
Status: DISCONTINUED | OUTPATIENT
Start: 2019-06-03 | End: 2019-06-06 | Stop reason: HOSPADM

## 2019-06-03 RX ORDER — BACLOFEN 10 MG/1
10 TABLET ORAL 2 TIMES DAILY PRN
Status: DISCONTINUED | OUTPATIENT
Start: 2019-06-03 | End: 2019-06-06 | Stop reason: HOSPADM

## 2019-06-03 RX ORDER — MONTELUKAST SODIUM 10 MG/1
10 TABLET ORAL NIGHTLY
Status: DISCONTINUED | OUTPATIENT
Start: 2019-06-03 | End: 2019-06-06 | Stop reason: HOSPADM

## 2019-06-03 RX ORDER — PANTOPRAZOLE SODIUM 40 MG/1
40 TABLET, DELAYED RELEASE ORAL EVERY MORNING
Status: DISCONTINUED | OUTPATIENT
Start: 2019-06-03 | End: 2019-06-03

## 2019-06-03 RX ORDER — PROPRANOLOL HYDROCHLORIDE 20 MG/1
20 TABLET ORAL 2 TIMES DAILY
COMMUNITY
End: 2019-06-06 | Stop reason: HOSPADM

## 2019-06-03 RX ORDER — POTASSIUM CHLORIDE 750 MG/1
10 CAPSULE, EXTENDED RELEASE ORAL 2 TIMES DAILY WITH MEALS
Status: DISCONTINUED | OUTPATIENT
Start: 2019-06-03 | End: 2019-06-06 | Stop reason: HOSPADM

## 2019-06-03 RX ORDER — ONDANSETRON 2 MG/ML
4 INJECTION INTRAMUSCULAR; INTRAVENOUS ONCE
Status: COMPLETED | OUTPATIENT
Start: 2019-06-03 | End: 2019-06-03

## 2019-06-03 RX ORDER — ONDANSETRON 2 MG/ML
4 INJECTION INTRAMUSCULAR; INTRAVENOUS EVERY 6 HOURS PRN
Status: DISCONTINUED | OUTPATIENT
Start: 2019-06-03 | End: 2019-06-03

## 2019-06-03 RX ORDER — SODIUM CHLORIDE AND POTASSIUM CHLORIDE 150; 900 MG/100ML; MG/100ML
50 INJECTION, SOLUTION INTRAVENOUS CONTINUOUS
Status: DISCONTINUED | OUTPATIENT
Start: 2019-06-03 | End: 2019-06-06

## 2019-06-03 RX ORDER — MORPHINE SULFATE 2 MG/ML
2 INJECTION, SOLUTION INTRAMUSCULAR; INTRAVENOUS EVERY 4 HOURS PRN
Status: DISCONTINUED | OUTPATIENT
Start: 2019-06-03 | End: 2019-06-06 | Stop reason: HOSPADM

## 2019-06-03 RX ORDER — AZELASTINE 1 MG/ML
1 SPRAY, METERED NASAL 2 TIMES DAILY
Status: DISCONTINUED | OUTPATIENT
Start: 2019-06-03 | End: 2019-06-03

## 2019-06-03 RX ORDER — TOPIRAMATE 25 MG/1
25 TABLET ORAL 2 TIMES DAILY
COMMUNITY

## 2019-06-03 RX ORDER — ONDANSETRON 2 MG/ML
4 INJECTION INTRAMUSCULAR; INTRAVENOUS EVERY 6 HOURS PRN
Status: DISCONTINUED | OUTPATIENT
Start: 2019-06-03 | End: 2019-06-06 | Stop reason: HOSPADM

## 2019-06-03 RX ORDER — LEVOTHYROXINE SODIUM 0.1 MG/1
100 TABLET ORAL EVERY MORNING
Status: DISCONTINUED | OUTPATIENT
Start: 2019-06-03 | End: 2019-06-06 | Stop reason: HOSPADM

## 2019-06-03 RX ORDER — FLUTICASONE PROPIONATE 50 MCG
1 SPRAY, SUSPENSION (ML) NASAL 2 TIMES DAILY
Status: DISCONTINUED | OUTPATIENT
Start: 2019-06-03 | End: 2019-06-06 | Stop reason: HOSPADM

## 2019-06-03 RX ORDER — PANTOPRAZOLE SODIUM 40 MG/1
40 TABLET, DELAYED RELEASE ORAL
Status: DISCONTINUED | OUTPATIENT
Start: 2019-06-03 | End: 2019-06-06 | Stop reason: HOSPADM

## 2019-06-03 RX ORDER — ASPIRIN 81 MG/1
81 TABLET, CHEWABLE ORAL DAILY
Status: DISCONTINUED | OUTPATIENT
Start: 2019-06-03 | End: 2019-06-06 | Stop reason: HOSPADM

## 2019-06-03 RX ORDER — PROPRANOLOL HYDROCHLORIDE 40 MG/1
80 TABLET ORAL 3 TIMES DAILY
Status: DISCONTINUED | OUTPATIENT
Start: 2019-06-03 | End: 2019-06-06 | Stop reason: HOSPADM

## 2019-06-03 RX ORDER — MECLIZINE HYDROCHLORIDE 25 MG/1
25 TABLET ORAL 2 TIMES DAILY PRN
COMMUNITY

## 2019-06-03 RX ADMIN — MORPHINE SULFATE 2 MG: 2 INJECTION, SOLUTION INTRAMUSCULAR; INTRAVENOUS at 14:29

## 2019-06-03 RX ADMIN — ONDANSETRON HYDROCHLORIDE 4 MG: 2 SOLUTION INTRAMUSCULAR; INTRAVENOUS at 21:20

## 2019-06-03 RX ADMIN — POTASSIUM CHLORIDE AND SODIUM CHLORIDE 75 ML/HR: 900; 150 INJECTION, SOLUTION INTRAVENOUS at 18:52

## 2019-06-03 RX ADMIN — IPRATROPIUM BROMIDE AND ALBUTEROL SULFATE 3 ML: 2.5; .5 SOLUTION RESPIRATORY (INHALATION) at 15:14

## 2019-06-03 RX ADMIN — MONTELUKAST SODIUM 10 MG: 10 TABLET, FILM COATED ORAL at 21:32

## 2019-06-03 RX ADMIN — MORPHINE SULFATE 2 MG: 2 INJECTION, SOLUTION INTRAMUSCULAR; INTRAVENOUS at 08:55

## 2019-06-03 RX ADMIN — IPRATROPIUM BROMIDE AND ALBUTEROL SULFATE 3 ML: 2.5; .5 SOLUTION RESPIRATORY (INHALATION) at 20:27

## 2019-06-03 RX ADMIN — DOCUSATE SODIUM 200 MG: 100 CAPSULE, LIQUID FILLED ORAL at 21:33

## 2019-06-03 RX ADMIN — MORPHINE SULFATE 4 MG: 2 INJECTION, SOLUTION INTRAMUSCULAR; INTRAVENOUS at 00:26

## 2019-06-03 RX ADMIN — FLUTICASONE PROPIONATE 1 SPRAY: 50 SPRAY, METERED NASAL at 21:32

## 2019-06-03 RX ADMIN — ONDANSETRON HYDROCHLORIDE 4 MG: 2 SOLUTION INTRAMUSCULAR; INTRAVENOUS at 02:52

## 2019-06-03 RX ADMIN — IOPAMIDOL 95 ML: 755 INJECTION, SOLUTION INTRAVENOUS at 03:50

## 2019-06-03 RX ADMIN — PHENYTOIN SODIUM 300 MG: 100 CAPSULE, EXTENDED RELEASE ORAL at 11:12

## 2019-06-03 RX ADMIN — IPRATROPIUM BROMIDE 0.5 MG: 0.5 SOLUTION RESPIRATORY (INHALATION) at 08:49

## 2019-06-03 RX ADMIN — PHENYTOIN SODIUM 300 MG: 100 CAPSULE, EXTENDED RELEASE ORAL at 04:57

## 2019-06-03 RX ADMIN — IPRATROPIUM BROMIDE 0.5 MG: 0.5 SOLUTION RESPIRATORY (INHALATION) at 12:34

## 2019-06-03 RX ADMIN — ONDANSETRON HYDROCHLORIDE 4 MG: 2 SOLUTION INTRAMUSCULAR; INTRAVENOUS at 00:25

## 2019-06-03 RX ADMIN — GUAIFENESIN 600 MG: 600 TABLET, EXTENDED RELEASE ORAL at 21:33

## 2019-06-03 RX ADMIN — PROPRANOLOL HYDROCHLORIDE 80 MG: 40 TABLET ORAL at 21:32

## 2019-06-03 RX ADMIN — NITROGLYCERIN 1 INCH: 20 OINTMENT TOPICAL at 00:25

## 2019-06-03 RX ADMIN — MORPHINE SULFATE 2 MG: 2 INJECTION, SOLUTION INTRAMUSCULAR; INTRAVENOUS at 18:51

## 2019-06-03 RX ADMIN — SODIUM CHLORIDE 125 ML/HR: 9 INJECTION, SOLUTION INTRAVENOUS at 04:00

## 2019-06-03 RX ADMIN — ONDANSETRON HYDROCHLORIDE 4 MG: 2 SOLUTION INTRAMUSCULAR; INTRAVENOUS at 09:01

## 2019-06-03 RX ADMIN — ONDANSETRON HYDROCHLORIDE 4 MG: 2 SOLUTION INTRAMUSCULAR; INTRAVENOUS at 14:30

## 2019-06-03 NOTE — PROGRESS NOTES
"Discharge Planning Assessment  [facility]     Patient Name: Emilia Bryant  MRN: [id number]  Today's Date: 6/3/2019    Admit Date: 6/2/2019    Discharge Needs Assessment    No documentation.       Discharge Plan     Row Name 06/03/19 1617       Plan    Plan  Home;  Wants [facility] if needed at d/c.    Plan Comments  Spoke with Pt at bedside.  CCP introduced self and role.  Pt confirmed information on face sheet.  Pt stated she is independent with \"most\" ADL'S.  Pt no longer drives.  Pt lives alone in an apartment.  Pt reports she has recently changed her PCP to [facility] [phone].  Pt is seen by FELIPE Almanza.  Pt confirms pharmacy as [facility] in [facility].  Pt denies issues with affording her medications.  Pt reports she has used [facility] in past and would be agreeable to use them again if need be.  Pt reports she has not been to a sub-acute rehab in the past.  Pt uses grab bars, shower chair, straight cane & rolling walker at times.  Pt uses home oxygen at 2LPM provided by [facility] [employer].  Pt has a private in home caregiver named Christina who assist her two times a week with bathing, cooking, cleaning, shopping and other errands.  Pt plans to return home at discharge.  Pt reports her emergency contact is her daughter Carmita Valverde [phone].  Pt reports her neighbor will transport her home at d/c.  CCP called Millicent, liaison with [facility] the referral in case Md orders home health at discharge.  Pt denies d/c needs.  CCP will continue to follow…DANA SHEEHAN/CCP        Destination      No service coordination in this encounter.      Durable Medical Equipment      No service coordination in this encounter.      Dialysis/Infusion      No service coordination in this encounter.      Home Medical Care      Service Provider Request Status Selected Services Address Phone Number Fax Number    [facility] Accepted N/A [address] [place] [id number] " [phone]      Therapy      No service coordination in this encounter.      Community Resources      No service coordination in this encounter.          Demographic Summary    No documentation.       Functional Status    No documentation.       Psychosocial    No documentation.       Abuse/Neglect    No documentation.       Legal    No documentation.       Substance Abuse    No documentation.       Patient Forms    No documentation.           Qiana La RN

## 2019-06-03 NOTE — ED PROVIDER NOTES
EMERGENCY DEPARTMENT ENCOUNTER    CHIEF COMPLAINT  Chief Complaint: CP  History given by: Patient  History limited by: NA  Room Number:   PMD: System, Provider Not In   Cardiologist: Dr. Orellana       HPI:  Pt is a 68 y.o. female with hx of MI who presents complaining of chest pain that began around 1800 this evening. Patient states pain has been constant since onset but has improved with nitro. Describes pain as tightness. Currently, patient rates her CP 8/10. Associated SOA, nausea, vomiting, and chills. Patient also c/o headache.       Duration/Onset/Timin tonight, constant  Location: Cardiac  Radiation: NA  Quality: Tight  Intensity/Severity: 8/10  Associated Symptoms: SOA, n/v, chills, headache  Aggravating or Alleviating Factors: Pain improved with nitro  Previous Episodes: Hx of MI      PAST MEDICAL HISTORY  Active Ambulatory Problems     Diagnosis Date Noted   • Dizzy 2019   • Visual changes 2019   • Generalized weakness 2019   • Acute nonintractable headache 2019   • Arthritis 2019   • Brain aneurysm 2019   • COPD (chronic obstructive pulmonary disease) (CMS/HCC) 2019   • Disease of thyroid gland 2019   • Legally blind 2019   • GERD (gastroesophageal reflux disease) 2019   • Migraine 2019   • Seizures (CMS/HCC) 2019   • Nausea & vomiting 2019   • Abdominal pain, epigastric 2019     Resolved Ambulatory Problems     Diagnosis Date Noted   • No Resolved Ambulatory Problems     Past Medical History:   Diagnosis Date   • Arthritis    • Asthma    • Brain aneurysm    • CHF (congestive heart failure) (CMS/HCC)    • COPD (chronic obstructive pulmonary disease) (CMS/HCC)    • Disease of thyroid gland    • GERD (gastroesophageal reflux disease)    • Legally blind    • Migraine    • Seizures (CMS/[facility])    • Stroke (CMS/[facility])        PAST SURGICAL HISTORY  Past Surgical History:   Procedure Laterality Date   • CEREBRAL ANEURYSM  REPAIR     •  SECTION     • CHOLECYSTECTOMY     • CRANIOTOMY     • HEMORRHOIDECTOMY     • HERNIA REPAIR     • HYSTERECTOMY     • WRIST SURGERY         FAMILY HISTORY  Family History   Problem Relation Age of Onset   • Breast cancer Mother    • Colon cancer Cousin    • Colon cancer Maternal Aunt    • Colon cancer Maternal Uncle        SOCIAL HISTORY  Social History     Socioeconomic History   • Marital status:      Spouse name: Not on file   • Number of children: Not on file   • Years of education: Not on file   • Highest education level: Not on file   Tobacco Use   • Smoking status: Never Smoker   • Smokeless tobacco: Never Used   Substance and Sexual Activity   • Alcohol use: No   • Drug use: No   • Sexual activity: Defer       ALLERGIES  Adhesive tape; Ambien [zolpidem]; Strawberry extract; and Topiramate    REVIEW OF SYSTEMS  Review of Systems   Constitutional: Positive for chills. Negative for fever.   HENT: Negative for sore throat.    Eyes: Negative.    Respiratory: Positive for shortness of breath. Negative for cough.    Cardiovascular: Positive for chest pain.   Gastrointestinal: Positive for nausea and vomiting. Negative for abdominal pain and diarrhea.   Genitourinary: Negative for dysuria.   Musculoskeletal: Negative for neck pain.   Skin: Negative for rash.   Allergic/Immunologic: Negative.    Neurological: Negative for weakness, numbness and headaches.   Hematological: Negative.    Psychiatric/Behavioral: Negative.    All other systems reviewed and are negative.      PHYSICAL EXAM  ED Triage Vitals   Temp Heart Rate Resp BP SpO2   19   97.8 °F (36.6 °C) 80 18 167/69 96 %      Temp src Heart Rate Source Patient Position BP Location FiO2 (%)   19 --   Temporal Monitor Lying Right arm        Physical Exam   Constitutional: She is oriented to person, place, and time. No  distress.   HENT:   Head: Normocephalic and atraumatic.   Eyes: EOM are normal. Pupils are equal, round, and reactive to light.   Neck: Normal range of motion. Neck supple.   Cardiovascular: Normal rate and regular rhythm.   Murmur heard.   Systolic murmur is present with a grade of 2/6.  Pulmonary/Chest: Effort normal. No respiratory distress. She has rhonchi (mild).   Abdominal: Soft. There is no tenderness. There is no rebound and no guarding.   Musculoskeletal: Normal range of motion. She exhibits no edema.   Neurological: She is alert and oriented to person, place, and time. She has normal sensation and normal strength.   Skin: Skin is warm and dry. No rash noted.   Psychiatric: Mood and affect normal.   Nursing note and vitals reviewed.      LAB RESULTS  Lab Results (last 24 hours)     Procedure Component Value Units Date/Time    CBC & Differential [[id number]] Collected:  06/02/19 2218    Specimen:  Blood Updated:  06/02/19 2236    Narrative:       The following orders were created for panel order CBC & Differential.  Procedure                               Abnormality         Status                     ---------                               -----------         ------                     CBC Auto Differential[[id number]]        Abnormal            Final result                 Please view results for these tests on the individual orders.    Comprehensive Metabolic Panel [[id number]]  (Abnormal) Collected:  06/02/19 2218    Specimen:  Blood Updated:  06/03/19 0131     Glucose 122 mg/dL      BUN 18 mg/dL      Creatinine 1.11 mg/dL      Sodium 132 mmol/L      Potassium 3.9 mmol/L      Chloride 95 mmol/L      CO2 24.2 mmol/L      Calcium 8.1 mg/dL      Total Protein 6.1 g/dL      Albumin 3.50 g/dL      ALT (SGPT) 8 U/L      AST (SGOT) 14 U/L      Alkaline Phosphatase 140 U/L      Total Bilirubin <0.2 mg/dL      eGFR Non African Amer 49 mL/min/1.73      Globulin 2.6 gm/dL      A/G Ratio 1.3 g/dL      BUN/Creatinine  Ratio 16.2     Anion Gap 12.8 mmol/L     Narrative:       GFR Normal >60  Chronic Kidney Disease <60  Kidney Failure <15    Troponin [[id number]]  (Normal) Collected:  06/02/19 2218    Specimen:  Blood Updated:  06/03/19 0127     Troponin T <0.010 ng/mL     Narrative:       Troponin T Reference Range:  <= 0.03 ng/mL-   Negative for AMI  >0.03 ng/mL-     Abnormal for myocardial necrosis.  Clinicians would have to utilize clinical acumen, EKG, Troponin and serial changes to determine if it is an Acute Myocardial Infarction or myocardial injury due to an underlying chronic condition.     CBC Auto Differential [[id number]]  (Abnormal) Collected:  06/02/19 2218    Specimen:  Blood Updated:  06/02/19 2236     WBC 5.46 10*3/mm3      RBC 3.66 10*6/mm3      Hemoglobin 10.8 g/dL      Hematocrit 33.5 %      MCV 91.5 fL      MCH 29.5 pg      MCHC 32.2 g/dL      RDW 12.6 %      RDW-SD 42.5 fl      MPV 8.8 fL      Platelets 259 10*3/mm3      Neutrophil % 67.9 %      Lymphocyte % 20.0 %      Monocyte % 9.0 %      Eosinophil % 2.2 %      Basophil % 0.5 %      Immature Grans % 0.4 %      Neutrophils, Absolute 3.71 10*3/mm3      Lymphocytes, Absolute 1.09 10*3/mm3      Monocytes, Absolute 0.49 10*3/mm3      Eosinophils, Absolute 0.12 10*3/mm3      Basophils, Absolute 0.03 10*3/mm3      Immature Grans, Absolute 0.02 10*3/mm3      nRBC 0.0 /100 WBC     Troponin [[id number]]  (Normal) Collected:  06/02/19 2222    Specimen:  Blood Updated:  06/02/19 2248     Troponin T <0.010 ng/mL     Narrative:       Troponin T Reference Range:  <= 0.03 ng/mL-   Negative for AMI  >0.03 ng/mL-     Abnormal for myocardial necrosis.  Clinicians would have to utilize clinical acumen, EKG, Troponin and serial changes to determine if it is an Acute Myocardial Infarction or myocardial injury due to an underlying chronic condition.     Troponin [[id number]]  (Normal) Collected:  06/03/19 0147    Specimen:  Blood Updated:  06/03/19 0223     Troponin T <0.010 ng/mL      Narrative:       Troponin T Reference Range:  <= 0.03 ng/mL-   Negative for AMI  >0.03 ng/mL-     Abnormal for myocardial necrosis.  Clinicians would have to utilize clinical acumen, EKG, Troponin and serial changes to determine if it is an Acute Myocardial Infarction or myocardial injury due to an underlying chronic condition.           I ordered the above labs and reviewed the results    RADIOLOGY  CT Angiogram Chest With Contrast   Final Result   1. No active disease or pulmonary embolism.   2. Stable moderate hiatal hernia.   3. Partial visualization of a suspected left renal cyst       This report was finalized on 6/3/2019 4:13 AM by Lemuel Shepard M.D.          XR Chest 2 View   Final Result   1. No active disease.       This report was finalized on 6/2/2019 11:27 PM by Lemuel Shepard M.D.               I ordered the above noted radiological studies. Interpreted by radiologist. Reviewed by me in PACS.     EKG        EKG time: 2155  Rhythm/Rate: NSR, 68  P waves and CT: Normal  QRS, axis: LAD, no QRS conduction delay  ST and T waves: No acute ST changes     Interpreted Contemporaneously by me, independently viewed  Unchanged compared to prior 4/24/19    PROCEDURES  Procedures      PROGRESS AND CONSULTS     2345: Blood work, CXR, and EKG reviewed. Pending repeat troponin.     2349: Nitrostat, morphine 4mg, and zofran 4mg ordered.     0233: Recheck. Patient states her pain is still 8/10. Labs reviewed. GFR stable. Will order CTA chest to further evaluate sx.     0427: Call out to [facility] to discuss admission.     0432: Recheck. Patient states her CP has improved but is still present. Discussed plan to consult cardiology for admission. Patient voiced understanding and is agreeable to plan.     0434: Spoke with Dr. Cain who covers for Dr. Orellana (cardiology). He will admit patient to telemetry.     MEDICAL DECISION MAKING  Results were reviewed/discussed with the patient and they were also made aware of online  access. Pt also made aware that some labs, such as cultures, will not be resulted during ER visit and follow up with PMD is necessary.     MDM  Number of Diagnoses or Management Options     Amount and/or Complexity of Data Reviewed  Clinical lab tests: reviewed  Tests in the radiology section of CPT®: reviewed  Decide to obtain previous medical records or to obtain history from someone other than the patient: yes  Discuss the patient with other providers: yes           DIAGNOSIS  Final diagnoses:   Precordial pain       DISPOSITION  ADMISSION    Discussed treatment plan and reason for admission with pt/family and admitting physician.  Pt/family voiced understanding of the plan for admission for further testing/treatment as needed.       Latest Documented Vital Signs:  As of 4:37 AM  BP- 149/66 HR- 85 Temp- 98.4 °F (36.9 °C) (Oral) O2 sat- 100%    --  Documentation assistance provided by nigel Haynes for Dr. Modesto Cevallos MD.  Information recorded by the scribe was done at my direction and has been verified and validated by me.               Pinky Haynes  06/03/19 7708       Modesto Cevallos MD  06/03/19 1850

## 2019-06-03 NOTE — DISCHARGE PLACEMENT REQUEST
"Werner Srivastava (68 y.o. Female)     Date of Birth Social Security Number Address Home Phone MRN    1950  [address]  [place] [address] [phone] [id number]    Spiritism Marital Status          Mosque        Admission Date Admission Type Admitting Provider Attending Provider Department, Room/Bed    6/2/19 Emergency Lemuel Cain MD Bessen, Matthew, MD [address] [facility], 2207/1    Discharge Date Discharge Disposition Discharge Destination                       Attending Provider:  Lemuel Cain MD    Allergies:  Adhesive Tape, Ambien [Zolpidem], Strawberry Extract, Topiramate    Isolation:  None   Infection:  None   Code Status:  Prior    Ht:  167.6 cm (66\")   Wt:  74.5 kg (164 lb 3.9 oz)    Admission Cmt:  None   Principal Problem:  None                Active Insurance as of 6/2/2019     Primary Coverage     Payor Plan Insurance Group Employer/Plan Group    HUMANA MEDICARE REPLACEMENT HUMANA MEDICARE REPL [id number]     Payor Plan Address Payor Plan Phone Number Payor Plan Fax Number Effective Dates    [address] [address] [phone]  1/1/2019 - None Entered    [facility] [address]       Subscriber Name Subscriber Birth Date Member ID       WERNER SRIVASTAVA 1950 [id number]                 Emergency Contacts      (Rel.) Home Phone Work Phone Mobile Phone    Carmita Valverde (Daughter) [phone] -- [phone]    GERARD, SISTER [phone] -- --              "

## 2019-06-03 NOTE — PROGRESS NOTES
Emilia Bryant   68 y.o.  female    LOS: 0 days   Patient Care Team:  System, Provider Not In as PCP - General  Pedro Encinas MD as PCP - Claims Attributed      Subjective     Interval History:     Patient Complaints: nausea and vomiting, headache, chr back pain    Review of Systems:       Medication Review:   Current Facility-Administered Medications:   •  aspirin tablet 325 mg, 325 mg, Oral, Once, Elder Dominguez MD, Stopped at 06/02/19 2205  •  sodium chloride 0.9 % flush 10 mL, 10 mL, Intravenous, PRN, Elder Dominguez MD  •  sodium chloride 0.9 % infusion, 125 mL/hr, Intravenous, Continuous, Modesto Cevallos MD, Last Rate: 125 mL/hr at 06/03/19 0400, 125 mL/hr at 06/03/19 0400      Objective   Vital Sign Min/Max for last 24 hours  Temp  Min: 97.8 °F (36.6 °C)  Max: 98.8 °F (37.1 °C)   BP  Min: 137/66  Max: 167/69    Pulse  Min: 73  Max: 85     Wt Readings from Last 3 Encounters:   06/03/19 74.5 kg (164 lb 3.9 oz)   04/24/19 72.1 kg (159 lb)   04/12/19 77.5 kg (170 lb 12.8 oz)      No intake or output data in the 24 hours ending 06/03/19 0802  Physical Exam:      General Appearance:    Well developed and well nourished elderly wf c/o nausea and headache in no acute distress   Head:    Normocephalic, atraumatic   Eyes:            Conjunctivae normal, non icteric, no xanthelasma   Neck:   supple, trachea midline, no thyromegaly, no carotid bruit, no jvd, no elevated cvp   Lungs:     Clear to auscultation,respirations regular, even and                  unlabored    Heart:    Regular rhythm and normal rate, normal S1 and S2,            No murmur, no gallop, no rub, no click   Chest Wall:    No abnormalities observed   Abdomen:     Normal bowel sounds, no masses, no organomegaly, soft        non-tender, non-distended, no guarding, no rebound                tenderness   Rectal:     Deferred   Extremities:   No edema. Moves all extremities well, no cyanosis, no erythema   Pulses:   Pulses palpable and equal bilaterally    Skin:   No bleeding, bruising or rash   Neurologic:   awake alert and oriented x3, speech clear and approp, no facial drooping     : voids   Monitor:   nsr vr 79    Results Review:         Sodium Sodium   Date Value Ref Range Status   06/02/2019 132 (L) 136 - 145 mmol/L Final      Potassium Potassium   Date Value Ref Range Status   06/02/2019 3.9 3.5 - 5.2 mmol/L Final      Chloride Chloride   Date Value Ref Range Status   06/02/2019 95 (L) 98 - 107 mmol/L Final      Bicarbonate No results found for: PLASMABICARB   BUN BUN   Date Value Ref Range Status   06/02/2019 18 8 - 23 mg/dL Final      Creatinine Creatinine   Date Value Ref Range Status   06/02/2019 1.11 (H) 0.57 - 1.00 mg/dL Final      Calcium Calcium   Date Value Ref Range Status   06/02/2019 8.1 (L) 8.6 - 10.5 mg/dL Final      Magnesium No results found for: MG     Results from last 7 days   Lab Units 06/02/19  2218   WBC 10*3/mm3 5.46   HEMOGLOBIN g/dL 10.8*   HEMATOCRIT % 33.5*   PLATELETS 10*3/mm3 259     Lab Results   Lab Value Date/Time    TROPONINT <0.010 06/03/2019 0147    TROPONINT <0.010 06/02/2019 2222    TROPONINT <0.010 06/02/2019 2218    TROPONINT <0.010 04/24/2019 2033    TROPONINT <0.010 04/24/2019 1832    TROPONINT <0.010 04/12/2019 1915    TROPONINT <0.010 04/12/2019 1624    TROPONINT <0.010 03/29/2019 0555    TROPONINT <0.010 03/28/2019 0432       Lab Results   Component Value Date    TSH 4.160 03/29/2019        Echo EF Estimated    PA AND LATERAL CHEST X-RAY 6/2/2019  CLINICAL HISTORY: Chest Pain Triage Protocol  COMPARISON: 04/24/2019.  FINDINGS: PA and lateral views of the chest were obtained. The lungs are  well expanded and appear clear. Borderline cardiomegaly, stable. Stable  retrocardiac opacity correlating to a known hiatal hernia. Vascularity  is normal. No pleural effusions.  IMPRESSION:  1. No active disease      CT ANGIOGRAM THORAX WITH CONTRAST, PULMONARY EMBOLISM PROTOCOL 6/3/2019  HISTORY: Pulmonary  embolism.  COMPARISON: 04/12/2019.     FINDINGS CHEST CT: Lungs clear without active airspace disease,  effusion, or pulmonary embolism. Aorta nonaneurysmal. Stable moderate  hiatal hernia. There is a low density left renal lesion which is  partially imaged and most likely reflects a small cyst           IMPRESSION:  1. No active disease or pulmonary embolism.  2. Stable moderate hiatal hernia.  3. Partial visualization of a suspected left renal cyst  Assessment/ Plan    Active Hospital Problems    Diagnosis POA   • Precordial pain [R07.2] Yes     Non cardiac discomfort  Trop neg x3  cxr and ct chest neg    Nausea/vomiting- consult IM    H/o cva    H/o aneurysms and curr has 2 by history    Plan  consult IM  No further cardiac workup indicated  Resume home meds  abd xr today  Head ct today  Bmp this am    Dorota Christianson, APRN  06/03/19  8:02 AM

## 2019-06-03 NOTE — ED NOTES
Nursing report ED to floor  Emilia Bryant  68 y.o.  female    HPI (triage note):   Chief Complaint   Patient presents with   • Chest Pain       Admitting doctor:   Lemuel Cain MD    Admitting diagnosis:   The encounter diagnosis was Precordial pain.    Code status:   Current Code Status     Date Active Code Status Order ID Comments User Context       Prior          Allergies:   Adhesive tape; Ambien [zolpidem]; Strawberry extract; and Topiramate    Weight:       06/03/19  0031   Weight: 74.8 kg (165 lb)       Most recent vitals:   Vitals:    06/03/19 0233 06/03/19 0303 06/03/19 0332 06/03/19 0403   BP: 137/66 156/74 165/77 149/66   BP Location:       Patient Position:       Pulse: 75 80 81 85   Resp:  16  16   Temp:       TempSrc:       SpO2: 97% 98% 99% 100%   Weight:       Height:           Active LDAs/IV Access:   Lines, Drains & Airways    Active LDAs     Name:   Placement date:   Placement time:   Site:   Days:    Peripheral IV 06/02/19 2331 Left Forearm   06/02/19 2331    Forearm   less than 1    Peripheral IV 06/03/19 0312 Right;Upper Arm   06/03/19 0312    Arm   less than 1                Labs (abnormal labs have a star):   Labs Reviewed   COMPREHENSIVE METABOLIC PANEL - Abnormal; Notable for the following components:       Result Value    Glucose 122 (*)     Creatinine 1.11 (*)     Sodium 132 (*)     Chloride 95 (*)     Calcium 8.1 (*)     Alkaline Phosphatase 140 (*)     Total Bilirubin <0.2 (*)     eGFR Non  Amer 49 (*)     All other components within normal limits    Narrative:     GFR Normal >60  Chronic Kidney Disease <60  Kidney Failure <15   CBC WITH AUTO DIFFERENTIAL - Abnormal; Notable for the following components:    RBC 3.66 (*)     Hemoglobin 10.8 (*)     Hematocrit 33.5 (*)     All other components within normal limits   TROPONIN (IN-HOUSE) - Normal    Narrative:     Troponin T Reference Range:  <= 0.03 ng/mL-   Negative for AMI  >0.03 ng/mL-     Abnormal for myocardial necrosis.   Clinicians would have to utilize clinical acumen, EKG, Troponin and serial changes to determine if it is an Acute Myocardial Infarction or myocardial injury due to an underlying chronic condition.    TROPONIN (IN-HOUSE) - Normal    Narrative:     Troponin T Reference Range:  <= 0.03 ng/mL-   Negative for AMI  >0.03 ng/mL-     Abnormal for myocardial necrosis.  Clinicians would have to utilize clinical acumen, EKG, Troponin and serial changes to determine if it is an Acute Myocardial Infarction or myocardial injury due to an underlying chronic condition.    TROPONIN (IN-HOUSE) - Normal    Narrative:     Troponin T Reference Range:  <= 0.03 ng/mL-   Negative for AMI  >0.03 ng/mL-     Abnormal for myocardial necrosis.  Clinicians would have to utilize clinical acumen, EKG, Troponin and serial changes to determine if it is an Acute Myocardial Infarction or myocardial injury due to an underlying chronic condition.    RAINBOW DRAW    Narrative:     The following orders were created for panel order [place] Draw.  Procedure                               Abnormality         Status                     ---------                               -----------         ------                     Light Blue Top[[id number]]                                   Final result               Green Top (Gel)[[id number]]                                  Final result               Lavender Top[[id number]]                                     Final result               Gold Top - SST[[id number]]                                   Final result                 Please view results for these tests on the individual orders.   CBC AND DIFFERENTIAL    Narrative:     The following orders were created for panel order CBC & Differential.  Procedure                               Abnormality         Status                     ---------                               -----------         ------                     CBC Auto Differential[[id number]]        Abnormal             Final result                 Please view results for these tests on the individual orders.   LIGHT BLUE TOP   GREEN TOP   LAVENDER TOP   GOLD TOP - SST       EKG:   ECG 12 Lead   Preliminary Result   HEART RATE= 68  bpm   RR Interval= 884  ms   AL Interval= 189  ms   P Horizontal Axis= -41  deg   P Front Axis= -11  deg   QRSD Interval= 104  ms   QT Interval= 408  ms   QRS Axis= -39  deg   T Wave Axis= 15  deg   - OTHERWISE NORMAL ECG -   Sinus rhythm   Left axis deviation   Electronically Signed By:    Date and Time of Study: 2019-06-02 21:55:12          Meds given in ED:   Medications   sodium chloride 0.9 % flush 10 mL (not administered)   aspirin tablet 325 mg (0 mg Oral Hold 6/2/19 2205)   sodium chloride 0.9 % infusion (125 mL/hr Intravenous New Bag 6/3/19 0400)   nitroglycerin (NITROSTAT) ointment 1 inch (1 inch Topical Given 6/3/19 0025)   morphine injection 4 mg (4 mg Intravenous Given 6/3/19 0026)   ondansetron (ZOFRAN) injection 4 mg (4 mg Intravenous Given 6/3/19 0025)   ondansetron (ZOFRAN) injection 4 mg (4 mg Intravenous Given 6/3/19 0252)   iopamidol (ISOVUE-370) 76 % injection 100 mL (95 mL Intravenous Given by Other 6/3/19 0350)   phenytoin (DILANTIN) ER capsule 300 mg (300 mg Oral Given 6/3/19 0457)       Imaging results:  Xr Chest 2 View    Result Date: 6/2/2019  1. No active disease.  This report was finalized on 6/2/2019 11:27 PM by Lemuel Shepard M.D.      Ct Angiogram Chest With Contrast    Result Date: 6/3/2019  1. No active disease or pulmonary embolism. 2. Stable moderate hiatal hernia. 3. Partial visualization of a suspected left renal cyst  This report was finalized on 6/3/2019 4:13 AM by Lemuel Shepard M.D.        Ambulatory status:   Up with assistance     Social issues:   Social History     Socioeconomic History   • Marital status:      Spouse name: Not on file   • Number of children: Not on file   • Years of education: Not on file   • Highest education level: Not on file    Tobacco Use   • Smoking status: Never Smoker   • Smokeless tobacco: Never Used   Substance and Sexual Activity   • Alcohol use: No   • Drug use: No   • Sexual activity: Shanda Salinas RN  06/03/19 9326

## 2019-06-03 NOTE — PROGRESS NOTES
Adult Nutrition  Assessment/PES    Patient Name:  Emilia Bryant  YOB: 1950  MRN: [id number]  Admit Date:  6/2/2019    Assessment Date:  6/3/2019    Comments: MST 6, assessed. Decreased appetite reported. Currently NPO, on IVF @ 125 cc/hr. Adm with n/v, headache. Will follow.     Reason for Assessment     Row Name 06/03/19 0855          Reason for Assessment    Reason For Assessment  identified at risk by screening criteria     Diagnosis  gastrointestinal disease     Identified At Risk by Screening Criteria  MST SCORE 2+         Nutrition/Diet History     Row Name 06/03/19 0855          Nutrition/Diet History    Typical Food/Fluid Intake   nausea, emesis, chills and chest discomfort beginning 6/2; intake has been poor.      Factors Affecting Nutritional Intake  other (see comments);altered gastrointestinal function;nausea;vomiting;pain pt is legally blind         Anthropometrics     Row Name 06/03/19 0856 06/03/19 0637       Anthropometrics    Height  --  -- 5'6    Weight  --  74.5 kg (164 lb 3.9 oz)       Admit Weight    Admit Weight  74.8 kg (165 lb)  --       Usual Body Weight (UBW)    Usual Body Weight  74.4 kg (164 lb) 3/2019  --       Body Mass Index (BMI)    BMI Assessment  BMI 25-29.9: overweight  --        Labs/Tests/Procedures/Meds     Row Name 06/03/19 0857          Labs/Procedures/Meds    Lab Results Reviewed  reviewed     Lab Results Comments  Na, Cl, Glu, Cr; troponins neg        Diagnostic Tests/Procedures    Diagnostic Test/Procedure Reviewed  reviewed, pertinent     Diagnostic Test/Procedures Comments  CXR wnl        Medications    Pertinent Medications Reviewed  reviewed     Pertinent Medications Comments  , dilantin, ppi         Physical Findings     Row Name 06/03/19 0858          Physical Findings    Skin  other (see comments) Bernabe 18, intact         Nutrition Prescription Ordered     Row Name 06/03/19 0858          Nutrition Prescription PO    Current PO Diet  NPO           Fluid Intake Evaluation    IV Fluid (mL)  3000  --        Problem/Interventions:  Problem 1     Row Name 06/03/19 0858          Nutrition Diagnoses Problem 1    Problem 1  Altered GI Function     Etiology (related to)  Factors Affecting Nutrition     Reported GI Symptoms  N & V                 Intervention Goal     Row Name 06/03/19 0859          Intervention Goal    General  Maintain nutrition;Reduce/improve symptoms;Disease management/therapy     PO  Establish PO;Advance diet     Weight  No significant weight loss         Nutrition Intervention     Row Name 06/03/19 0859          Nutrition Intervention    RD/Tech Action  Follow Tx progress;Care plan reviewd;Await begin PO           Education/Evaluation     Row Name 06/03/19 0859          Education    Education  Will Instruct as appropriate        Monitor/Evaluation    Monitor  Per protocol           Electronically signed by:  Almita Lara RD  06/03/19 8:59 AM

## 2019-06-03 NOTE — CONSULTS
Internal medicine consult    Primary care physician  Dr. EPPERSON    Referring physician  Dr. GERARDO    Chief complaint  Chest pain    Reason for consult  Follow medical problems    History of present illness  68-year-old white female with history of COPD congestive heart failure hypothyroidism seizure disorder and gastroesophageal reflux presented to [facility] emergency room with chest pain that started yesterday evening.  Patient described pain as a constant which improved with nitro.  Patient denies any associated symptoms including shortness of breath nausea vomiting or diaphoresis.  Patient does have some chills but no fever also complained of headache and described pain as a tightness and constant no relationship with food exertion or movement.  Patient evaluated in ER and initial work-up negative admit to cardiology service and I am asked to follow the patient for medical problem.  At the time of interview patient still hurting but no respiratory distress.    PAST MEDICAL HISTORY  • Arthritis     • Asthma     • Brain aneurysm     • CHF (congestive heart failure) (CMS/HCC)     • COPD (chronic obstructive pulmonary disease) (CMS/HCC)     • Disease of thyroid gland     • GERD (gastroesophageal reflux disease)     • Legally blind     • Migraine     • Seizures (CMS/HCC)     • Stroke (CMS/HCC)        PAST SURGICAL HISTORY  Surgical History         Past Surgical History:   Procedure Laterality Date   • CEREBRAL ANEURYSM REPAIR       •  SECTION       • CHOLECYSTECTOMY       • CRANIOTOMY       • HEMORRHOIDECTOMY       • HERNIA REPAIR       • HYSTERECTOMY       • WRIST SURGERY             FAMILY HISTORY        Family History   Problem Relation Age of Onset   • Breast cancer Mother     • Colon cancer Cousin     • Colon cancer Maternal Aunt     • Colon cancer Maternal Uncle        SOCIAL HISTORY  Social History   Social History            Socioeconomic History   • Marital status:        Spouse name:  "Not on file   • Number of children: Not on file   • Years of education: Not on file   • Highest education level: Not on file   Tobacco Use   • Smoking status: Never Smoker   • Smokeless tobacco: Never Used   Substance and Sexual Activity   • Alcohol use: No   • Drug use: No   • Sexual activity: Defer         ALLERGIES  Adhesive tape; Ambien [zolpidem]; Strawberry extract; and Topiramate  Home medications reviewed     REVIEW OF SYSTEMS  Constitutional: Positive for chills. Negative for fever.   HENT: Negative for sore throat.    Eyes: Negative.    Respiratory: Positive for shortness of breath. Negative for cough.    Cardiovascular: Positive for chest pain.   Gastrointestinal: Positive for nausea and vomiting. Negative for abdominal pain and diarrhea.   Genitourinary: Negative for dysuria.   Musculoskeletal: Negative for neck pain.   Skin: Negative for rash.   Allergic/Immunologic: Negative.    Neurological: Negative for weakness, numbness and headaches.   Hematological: Negative.    Psychiatric/Behavioral: Negative.    All other systems reviewed and are negative.     PHYSICAL EXAM  Blood pressure 155/68, pulse 80, temperature 99.2 °F (37.3 °C), temperature source Oral, resp. rate 18, height 167.6 cm (66\"), weight 74.5 kg (164 lb 3.9 oz), SpO2 100 %.    Constitutional: She is oriented to person, place, and time. No distress.   Head: Normocephalic and atraumatic.   Eyes: EOM are normal. Pupils are equal, round, and reactive to light.   Neck: Normal range of motion. Neck supple.   Cardiovascular: Normal rate and regular rhythm. Murmur heard.  Pulmonary/Chest: Effort normal. No respiratory distress. She has rhonchi (mild).   Abdominal: Soft. There is no tenderness. There is no rebound and no guarding.   Musculoskeletal: Normal range of motion. She exhibits no edema.   Neurological: She is alert and oriented to person, place, and time. She has normal sensation and normal strength.   Skin: Skin is warm and dry. No rash " noted.   Psychiatric: Mood and affect normal.     LAB RESULTS  Lab Results (last 24 hours)     Procedure Component Value Units Date/Time    Basic Metabolic Panel [[id number]]  (Abnormal) Collected:  06/03/19 0843    Specimen:  Blood Updated:  06/03/19 1017     Glucose 114 mg/dL      BUN 15 mg/dL      Creatinine 1.05 mg/dL      Sodium 133 mmol/L      Potassium 4.0 mmol/L      Chloride 95 mmol/L      CO2 21.6 mmol/L      Calcium 8.8 mg/dL      eGFR Non African Amer 52 mL/min/1.73      BUN/Creatinine Ratio 14.3     Anion Gap 16.4 mmol/L     Narrative:       GFR Normal >60  Chronic Kidney Disease <60  Kidney Failure <15    Troponin [[id number]]  (Normal) Collected:  06/03/19 0147    Specimen:  Blood Updated:  06/03/19 0223     Troponin T <0.010 ng/mL     Narrative:       Troponin T Reference Range:  <= 0.03 ng/mL-   Negative for AMI  >0.03 ng/mL-     Abnormal for myocardial necrosis.  Clinicians would have to utilize clinical acumen, EKG, Troponin and serial changes to determine if it is an Acute Myocardial Infarction or myocardial injury due to an underlying chronic condition.     Comprehensive Metabolic Panel [[id number]]  (Abnormal) Collected:  06/02/19 2218    Specimen:  Blood Updated:  06/03/19 0131     Glucose 122 mg/dL      BUN 18 mg/dL      Creatinine 1.11 mg/dL      Sodium 132 mmol/L      Potassium 3.9 mmol/L      Chloride 95 mmol/L      CO2 24.2 mmol/L      Calcium 8.1 mg/dL      Total Protein 6.1 g/dL      Albumin 3.50 g/dL      ALT (SGPT) 8 U/L      AST (SGOT) 14 U/L      Alkaline Phosphatase 140 U/L      Total Bilirubin <0.2 mg/dL      eGFR Non African Amer 49 mL/min/1.73      Globulin 2.6 gm/dL      A/G Ratio 1.3 g/dL      BUN/Creatinine Ratio 16.2     Anion Gap 12.8 mmol/L     Narrative:       GFR Normal >60  Chronic Kidney Disease <60  Kidney Failure <15    Troponin [[id number]]  (Normal) Collected:  06/02/19 2218    Specimen:  Blood Updated:  06/03/19 0127     Troponin T <0.010 ng/mL     Narrative:        Troponin T Reference Range:  <= 0.03 ng/mL-   Negative for AMI  >0.03 ng/mL-     Abnormal for myocardial necrosis.  Clinicians would have to utilize clinical acumen, EKG, Troponin and serial changes to determine if it is an Acute Myocardial Infarction or myocardial injury due to an underlying chronic condition.     [place] Draw [[id number]] Collected:  06/02/19 2218    Specimen:  Blood Updated:  06/03/19 0114    Narrative:       The following orders were created for panel order [place] Draw.  Procedure                               Abnormality         Status                     ---------                               -----------         ------                     Light Blue Top[[id number]]                                   Final result               Green Top (Gel)[[id number]]                                  Final result               Lavender Top[[id number]]                                     Final result               Gold Top - SST[[id number]]                                   Final result                 Please view results for these tests on the individual orders.    Green Top (Gel) [[id number]] Collected:  06/02/19 2218    Specimen:  Blood Updated:  06/03/19 0114     Extra Tube Hold for add-ons.     Comment: Auto resulted.       Light Blue Top [[id number]] Collected:  06/02/19 2218    Specimen:  Blood Updated:  06/02/19 2331     Extra Tube hold for add-on     Comment: Auto resulted       Lavender Top [[id number]] Collected:  06/02/19 2218    Specimen:  Blood Updated:  06/02/19 2331     Extra Tube hold for add-on     Comment: Auto resulted       Gold Top - SST [[id number]] Collected:  06/02/19 2218    Specimen:  Blood Updated:  06/02/19 2331     Extra Tube Hold for add-ons.     Comment: Auto resulted.       Troponin [[id number]]  (Normal) Collected:  06/02/19 2222    Specimen:  Blood Updated:  06/02/19 2248     Troponin T <0.010 ng/mL     Narrative:       Troponin T Reference Range:  <= 0.03 ng/mL-   Negative for  AMI  >0.03 ng/mL-     Abnormal for myocardial necrosis.  Clinicians would have to utilize clinical acumen, EKG, Troponin and serial changes to determine if it is an Acute Myocardial Infarction or myocardial injury due to an underlying chronic condition.     CBC & Differential [[id number]] Collected:  06/02/19 2218    Specimen:  Blood Updated:  06/02/19 2236    Narrative:       The following orders were created for panel order CBC & Differential.  Procedure                               Abnormality         Status                     ---------                               -----------         ------                     CBC Auto Differential[[id number]]        Abnormal            Final result                 Please view results for these tests on the individual orders.    CBC Auto Differential [[id number]]  (Abnormal) Collected:  06/02/19 2218    Specimen:  Blood Updated:  06/02/19 2236     WBC 5.46 10*3/mm3      RBC 3.66 10*6/mm3      Hemoglobin 10.8 g/dL      Hematocrit 33.5 %      MCV 91.5 fL      MCH 29.5 pg      MCHC 32.2 g/dL      RDW 12.6 %      RDW-SD 42.5 fl      MPV 8.8 fL      Platelets 259 10*3/mm3      Neutrophil % 67.9 %      Lymphocyte % 20.0 %      Monocyte % 9.0 %      Eosinophil % 2.2 %      Basophil % 0.5 %      Immature Grans % 0.4 %      Neutrophils, Absolute 3.71 10*3/mm3      Lymphocytes, Absolute 1.09 10*3/mm3      Monocytes, Absolute 0.49 10*3/mm3      Eosinophils, Absolute 0.12 10*3/mm3      Basophils, Absolute 0.03 10*3/mm3      Immature Grans, Absolute 0.02 10*3/mm3      nRBC 0.0 /100 WBC         Imaging Results (last 24 hours)     Procedure Component Value Units Date/Time    XR Abdomen KUB [[id number]] Collected:  06/03/19 1158     Updated:  06/03/19 1204    Narrative:       XR ABDOMEN KUB-     INDICATIONS: Nausea, vomiting     TECHNIQUE: SUPINE VIEWS OF THE ABDOMEN     COMPARISON: The  image from CT of 04/01/2019     FINDINGS:      The bowel gas pattern is nonobstructive. Mild colonic  fecal retention.  No supine evidence for free peritoneal gas. Pain pump device projects at  the level of the left abdomen. No definite nephrolithiasis. Nonspecific  soft tissue calcifications in the pelvis. Residual contrast material is  seen in the urinary bladder. If there is further clinical concern, CT  can be obtained for further examination.       Impression:          As described.     This report was finalized on 6/3/2019 12:01 PM by Dr. Angel Benitez M.D.       CT Head Without Contrast [[id number]] Collected:  06/03/19 1153     Updated:  06/03/19 1154    Narrative:       CT HEAD WITHOUT CONTRAST      HISTORY:  Headache.     COMPARISON: CT head 03/28/2019.     A right frontoparietal and temporal craniotomy is noted. Surgical clips  are identified adjacent to the posterior clinoid on the right. There is  encephalomalacia appreciated involving the right temporal lobe  anteriorly and right frontal lobe inferolaterally similar to 03/28/2019.     There is increased attenuation superior to the clips measuring 10 mm in  AP dimension suggestive of enlargement of the ICA terminus. This appears  similar to minimally more prominent as compared to the CT examination of  02/14/2005, but unchanged versus 03/28/2019 grossly. There is no  evidence of subarachnoid hemorrhage, hydrocephalus or of abnormal  extra-axial fluid. No focal area of decreased attenuation to suggest  acute infarction is identified. Further evaluation could be performed  with an MRI/MRA of the brain or with a conventional catheter directed  arteriogram as indicated. The above information was called to the  patient's nurse at the time of the dictation. The patient's nurse is to  immediately relay the information to the clinical service.            Radiation dose reduction techniques were utilized, including automated  exposure control and exposure modulation based on body size.          CT Angiogram Chest With Contrast [[id number]] Collected:   06/03/19 0409     Updated:  06/03/19 0416    Narrative:       CT ANGIOGRAM THORAX WITH CONTRAST, PULMONARY EMBOLISM PROTOCOL     HISTORY: Pulmonary embolism.     COMPARISON: 04/12/2019.     TECHNIQUE: Radiation dose reduction techniques were utilized, including  automated exposure control and exposure modulation based on body size.  Axial contrast-enhanced images of the chest were obtained according to  the pulmonary embolism protocol. Coronal oblique 3-D MIP reformatted  images were supplemented and reviewed.  100 mls of non ionic contrast  was utilized intravenously.     FINDINGS CHEST CT: Lungs clear without active airspace disease,  effusion, or pulmonary embolism. Aorta nonaneurysmal. Stable moderate  hiatal hernia. There is a low density left renal lesion which is  partially imaged and most likely reflects a small cyst             Impression:       1. No active disease or pulmonary embolism.  2. Stable moderate hiatal hernia.  3. Partial visualization of a suspected left renal cyst     This report was finalized on 6/3/2019 4:13 AM by Lemuel Shepard M.D.       XR Chest 2 View [[id number]] Collected:  06/02/19 2325     Updated:  06/02/19 2330    Narrative:       PA AND LATERAL CHEST X-RAY     CLINICAL HISTORY: Chest Pain Triage Protocol     COMPARISON: 04/24/2019.     FINDINGS: PA and lateral views of the chest were obtained. The lungs are  well expanded and appear clear. Borderline cardiomegaly, stable. Stable  retrocardiac opacity correlating to a known hiatal hernia. Vascularity  is normal. No pleural effusions.             Impression:       1. No active disease.     This report was finalized on 6/2/2019 11:27 PM by Lemuel Shepard M.D.           EKG                              Rhythm/Rate: NSR, 68  P waves and ID: Normal  QRS, axis: LAD, no QRS conduction delay  ST and T waves: No acute ST changes       Current Facility-Administered Medications:   •  acetaminophen (TYLENOL) suppository 650 mg, 650 mg, Rectal,  Q4H PRN, Lemuel Cain MD  •  aspirin tablet 325 mg, 325 mg, Oral, Once, Elder Dominguez MD, Stopped at 06/02/19 2205  •  azelastine (ASTELIN) nasal spray 1 spray, 1 spray, Each Nare, BID, oDrota Christianson, APRN  •  baclofen (LIORESAL) tablet 10 mg, 10 mg, Oral, Q12H, Dorota Christianson, APRN  •  docusate sodium (COLACE) capsule 200 mg, 200 mg, Oral, BID, Dorota Christianson, APRN  •  fluticasone (FLONASE) 50 MCG/ACT nasal spray 1 spray, 1 spray, Each Nare, BID, Dorota Christianson, APRN  •  furosemide (LASIX) tablet 40 mg, 40 mg, Oral, BID, Dorota Christianson, APRN  •  Glycerin-Hypromellose- (ARTIFICIAL TEARS) 0.2-0.2-1 % ophthalmic solution solution 1 drop, 1 drop, Both Eyes, Q3H PRN, Dorota Christianson, APRN  •  ipratropium (ATROVENT) nebulizer solution 0.5 mg, 0.5 mg, Nebulization, 4x Daily - RT, Dorota Christianson APRN, 0.5 mg at 06/03/19 1234  •  levothyroxine (SYNTHROID, LEVOTHROID) tablet 100 mcg, 100 mcg, Oral, QAM, Dorota Christianson, APRN  •  montelukast (SINGULAIR) tablet 10 mg, 10 mg, Oral, Nightly, Dorota Christianson, APRN  •  morphine injection 2 mg, 2 mg, Intravenous, Q4H PRN, Dorota Christianson, APRN, 2 mg at 06/03/19 0855  •  O2 (OXYGEN), 2 L/min, Inhalation, Once, Dorota Christianson APRN  •  ondansetron (ZOFRAN) injection 4 mg, 4 mg, Intravenous, Q6H PRN, Dorota Christianson, APRN, 4 mg at 06/03/19 0901  •  pantoprazole (PROTONIX) EC tablet 40 mg, 40 mg, Oral, QAM, Dorota Christianson, APRN  •  phenytoin (DILANTIN) ER capsule 300 mg, 300 mg, Oral, Daily, Dorota Christianson APRN, 300 mg at 06/03/19 1112  •  potassium chloride (MICRO-K) CR capsule 10 mEq, 10 mEq, Oral, BID With Meals, Dorota Christianson APRN  •  propranolol (INDERAL) tablet 80 mg, 80 mg, Oral, TID, Dorota Christianson APRN  •  sodium chloride 0.9 % flush 10 mL, 10 mL, Intravenous, PRN, Elder Dominguez MD  •  sodium chloride 0.9 % infusion, 125 mL/hr, Intravenous, Continuous, Modesto Cevallos MD, Last Rate: 125 mL/hr at 06/03/19 0400, 125 mL/hr at 06/03/19 0400      ASSESSMENT  Chest pain rule out acute coronary syndrome  Hypertension  Hyperlipidemia  Hypothyroidism  COPD  Seizure disorder  Chronic anemia  Gastroesophageal reflux disease    PLAN  Agree with current care  IV fluid  Check Dilantin level  Protonix 40 p.o. twice daily  Serial cardiac exam EKG  Continue home medications  Stress ulcer DVT prophylaxis  Supportive care  Patient is full code  We will follow with  and further recommendation according to hospital course    SUZY MENJIVAR MD

## 2019-06-03 NOTE — H&P
68-year-old female with nausea and emesis chills and chest discomfort beginning this afternoon.    Present illness:  Complaints of emesis chills and chest discomfort began in the afternoon.  This is not resolved.  She continues to have vomiting.  The chest discomfort is diffuse located over the entire anterior chest.  No shortness of air.  No pleuritic chest pain.  She has a new complaint of sinus congestion.    In the emergency room initial 2 troponins were negative.  CT angiogram of the chest was negative for pulmonary embolism and showed no infiltrate.    Past history:  Ménière's disease in the past.  She says that her current difficulty with emesis is not characteristic of her past episodes of Ménière's disease.  She does not currently have vertigo.  Reports of established coronary disease.  She is seen by .  The patient does not corroborate these reports and to her knowledge has no awareness of previous coronary disease.  She denies previous coronary stent.  Previous CVA  Severe visual impairment she can see only some shadows  2 brain aneurysms.  Attempts to correct the surgically were unsuccessful.  Remote seizure  Asthma   Surgeries include hysterectomy cholecystectomy and repair of hiatal hernia    Review of system:  General: She gives her height is 66 inches weight 163 pounds.  She has chills.  No fever has been recorded.  Endocrine: No diabetes  Head: Sinus congestion as discussed.  Chronic visual disturbance.  Gastrointestinal: No alcoholic beverages.  No peptic ulcer disease.  No melena, hematochezia, diarrhea, constipation  Respiratory: No productive cough, no smoking  Cardiac: No edema, no orthopnea, no dizziness or syncope.  No preceding exertional chest pain.  She has chronic shortness of air related to her asthma.  Genitourinary: No dysuria  Neurologic: See past history  Psychologic: No depression  All other systems negative    Social: She lives alone.  She has a caregiver who shops for  "her.  Her daughter and sister live locally.    Allergies: Adhesive tape, zolpidem, topiramate    Medications: We do not have an accurate medical profile at this time    Exam:  /66 (BP Location: Left arm, Patient Position: Lying)   Pulse 83   Temp 98.8 °F (37.1 °C) (Oral)   Resp 18   Ht 167.6 cm (66\")   Wt 74.5 kg (164 lb 3.9 oz)   SpO2 99%   BMI 26.51 kg/m²     Vitals:    06/03/19 0637   BP: 151/66   Pulse: 83   Resp: 18   Temp: 98.8 °F (37.1 °C)   SpO2: 99%     Gen: Well-developed, overweight, elderly white female with distress related to her nausea.  Skin: no rash or ulcer  Eyes: reactive pupils, no xanthelasma  Oral: fair dentition, no pallor, moist  Neck: no thyroid enlargement, normal carotid impulses, no carotid or orbital bruit,   CVP = 3 cm, contour is difficult to assess  Spine: no scoliosis  Chest: clear  Cor: apex midclavicular line, normal s1 and s2, 2/6 systolic ejection murmur, no gallop  Ab: soft, non tender, no organ enlargement, bowel sounds present, no aortic enlargement, no bruit  Rectal: not done  Ex: No edema, no cyanosis, no clubbing  PV: +2 bilateral DP, +2 bilateral PT, +2 bilateral femoral  N: alert, oriented x 3, no agitation, motor function intact all extremities, no tremor, no ataxia    EKG shows sinus rhythm left axis deviation.  CT angiogram of the chest showed no pulmonary embolism and no infiltrative process    Assessment:  Nausea and recurring emesis.  Complaint of chills.  No identified focus of infection at this time.  Origin of her difficulty is obscured.  It does not appear likely that her complaint of sinus congestion is related to this behavior.  Troponins normal.  One troponin will be repeated.  Plan: Consult to internal medicine for management of her emesis.            "

## 2019-06-04 LAB
ALBUMIN SERPL-MCNC: 3.3 G/DL (ref 3.5–5.2)
ALBUMIN/GLOB SERPL: 1.3 G/DL
ALP SERPL-CCNC: 127 U/L (ref 39–117)
ALT SERPL W P-5'-P-CCNC: 11 U/L (ref 1–33)
ANION GAP SERPL CALCULATED.3IONS-SCNC: 9.2 MMOL/L
AST SERPL-CCNC: 18 U/L (ref 1–32)
BASOPHILS # BLD AUTO: 0.05 10*3/MM3 (ref 0–0.2)
BASOPHILS NFR BLD AUTO: 0.8 % (ref 0–1.5)
BILIRUB SERPL-MCNC: 0.2 MG/DL (ref 0.2–1.2)
BUN BLD-MCNC: 10 MG/DL (ref 8–23)
BUN/CREAT SERPL: 11.4 (ref 7–25)
CALCIUM SPEC-SCNC: 8.8 MG/DL (ref 8.6–10.5)
CHLORIDE SERPL-SCNC: 99 MMOL/L (ref 98–107)
CHOLEST SERPL-MCNC: 199 MG/DL (ref 0–200)
CO2 SERPL-SCNC: 24.8 MMOL/L (ref 22–29)
CREAT BLD-MCNC: 0.88 MG/DL (ref 0.57–1)
DEPRECATED RDW RBC AUTO: 45.4 FL (ref 37–54)
EOSINOPHIL # BLD AUTO: 0.17 10*3/MM3 (ref 0–0.4)
EOSINOPHIL NFR BLD AUTO: 2.9 % (ref 0.3–6.2)
ERYTHROCYTE [DISTWIDTH] IN BLOOD BY AUTOMATED COUNT: 13.1 % (ref 12.3–15.4)
GFR SERPL CREATININE-BSD FRML MDRD: 64 ML/MIN/1.73
GLOBULIN UR ELPH-MCNC: 2.5 GM/DL
GLUCOSE BLD-MCNC: 94 MG/DL (ref 65–99)
HBA1C MFR BLD: 5.3 % (ref 4.8–5.6)
HCT VFR BLD AUTO: 31.4 % (ref 34–46.6)
HDLC SERPL-MCNC: 78 MG/DL (ref 40–60)
HGB BLD-MCNC: 10 G/DL (ref 12–15.9)
IMM GRANULOCYTES # BLD AUTO: 0.02 10*3/MM3 (ref 0–0.05)
IMM GRANULOCYTES NFR BLD AUTO: 0.3 % (ref 0–0.5)
LDLC SERPL CALC-MCNC: 102 MG/DL (ref 0–100)
LDLC/HDLC SERPL: 1.31 {RATIO}
LYMPHOCYTES # BLD AUTO: 1.3 10*3/MM3 (ref 0.7–3.1)
LYMPHOCYTES NFR BLD AUTO: 21.9 % (ref 19.6–45.3)
MCH RBC QN AUTO: 29.9 PG (ref 26.6–33)
MCHC RBC AUTO-ENTMCNC: 31.8 G/DL (ref 31.5–35.7)
MCV RBC AUTO: 94 FL (ref 79–97)
MONOCYTES # BLD AUTO: 0.74 10*3/MM3 (ref 0.1–0.9)
MONOCYTES NFR BLD AUTO: 12.5 % (ref 5–12)
NEUTROPHILS # BLD AUTO: 3.66 10*3/MM3 (ref 1.7–7)
NEUTROPHILS NFR BLD AUTO: 61.6 % (ref 42.7–76)
NRBC BLD AUTO-RTO: 0 /100 WBC (ref 0–0.2)
NT-PROBNP SERPL-MCNC: 858.1 PG/ML (ref 5–900)
PHENYTOIN SERPL-MCNC: 11.7 MCG/ML (ref 10–20)
PLATELET # BLD AUTO: 256 10*3/MM3 (ref 140–450)
PMV BLD AUTO: 9 FL (ref 6–12)
POTASSIUM BLD-SCNC: 4.3 MMOL/L (ref 3.5–5.2)
PROT SERPL-MCNC: 5.8 G/DL (ref 6–8.5)
RBC # BLD AUTO: 3.34 10*6/MM3 (ref 3.77–5.28)
SODIUM BLD-SCNC: 133 MMOL/L (ref 136–145)
TRIGL SERPL-MCNC: 93 MG/DL (ref 0–150)
TSH SERPL DL<=0.05 MIU/L-ACNC: 1.63 MIU/ML (ref 0.27–4.2)
VLDLC SERPL-MCNC: 18.6 MG/DL (ref 5–40)
WBC NRBC COR # BLD: 5.94 10*3/MM3 (ref 3.4–10.8)

## 2019-06-04 PROCEDURE — 25010000002 ONDANSETRON PER 1 MG: Performed by: HOSPITALIST

## 2019-06-04 PROCEDURE — 83880 ASSAY OF NATRIURETIC PEPTIDE: CPT | Performed by: HOSPITALIST

## 2019-06-04 PROCEDURE — 94799 UNLISTED PULMONARY SVC/PX: CPT

## 2019-06-04 PROCEDURE — 99222 1ST HOSP IP/OBS MODERATE 55: CPT | Performed by: INTERNAL MEDICINE

## 2019-06-04 PROCEDURE — 84443 ASSAY THYROID STIM HORMONE: CPT | Performed by: HOSPITALIST

## 2019-06-04 PROCEDURE — 85025 COMPLETE CBC W/AUTO DIFF WBC: CPT | Performed by: HOSPITALIST

## 2019-06-04 PROCEDURE — 25810000003 SODIUM CHLORIDE 0.9 % WITH KCL 20 MEQ 20-0.9 MEQ/L-% SOLUTION: Performed by: HOSPITALIST

## 2019-06-04 PROCEDURE — 80061 LIPID PANEL: CPT | Performed by: HOSPITALIST

## 2019-06-04 PROCEDURE — 80185 ASSAY OF PHENYTOIN TOTAL: CPT | Performed by: HOSPITALIST

## 2019-06-04 PROCEDURE — 83036 HEMOGLOBIN GLYCOSYLATED A1C: CPT | Performed by: HOSPITALIST

## 2019-06-04 PROCEDURE — 25010000002 MORPHINE PER 10 MG: Performed by: NURSE PRACTITIONER

## 2019-06-04 PROCEDURE — 80053 COMPREHEN METABOLIC PANEL: CPT | Performed by: HOSPITALIST

## 2019-06-04 RX ORDER — SUCRALFATE ORAL 1 G/10ML
1 SUSPENSION ORAL
Status: DISCONTINUED | OUTPATIENT
Start: 2019-06-04 | End: 2019-06-06 | Stop reason: HOSPADM

## 2019-06-04 RX ADMIN — FLUTICASONE PROPIONATE 1 SPRAY: 50 SPRAY, METERED NASAL at 22:00

## 2019-06-04 RX ADMIN — ONDANSETRON HYDROCHLORIDE 4 MG: 2 SOLUTION INTRAMUSCULAR; INTRAVENOUS at 23:26

## 2019-06-04 RX ADMIN — MONTELUKAST SODIUM 10 MG: 10 TABLET, FILM COATED ORAL at 21:59

## 2019-06-04 RX ADMIN — FLUTICASONE PROPIONATE 1 SPRAY: 50 SPRAY, METERED NASAL at 08:28

## 2019-06-04 RX ADMIN — PROPRANOLOL HYDROCHLORIDE 80 MG: 40 TABLET ORAL at 17:08

## 2019-06-04 RX ADMIN — MORPHINE SULFATE 2 MG: 2 INJECTION, SOLUTION INTRAMUSCULAR; INTRAVENOUS at 18:20

## 2019-06-04 RX ADMIN — IPRATROPIUM BROMIDE AND ALBUTEROL SULFATE 3 ML: 2.5; .5 SOLUTION RESPIRATORY (INHALATION) at 14:41

## 2019-06-04 RX ADMIN — DOCUSATE SODIUM 200 MG: 100 CAPSULE, LIQUID FILLED ORAL at 21:59

## 2019-06-04 RX ADMIN — PANTOPRAZOLE SODIUM 40 MG: 40 TABLET, DELAYED RELEASE ORAL at 06:56

## 2019-06-04 RX ADMIN — ONDANSETRON HYDROCHLORIDE 4 MG: 2 SOLUTION INTRAMUSCULAR; INTRAVENOUS at 14:43

## 2019-06-04 RX ADMIN — PROPRANOLOL HYDROCHLORIDE 80 MG: 40 TABLET ORAL at 08:26

## 2019-06-04 RX ADMIN — MORPHINE SULFATE 2 MG: 2 INJECTION, SOLUTION INTRAMUSCULAR; INTRAVENOUS at 08:40

## 2019-06-04 RX ADMIN — POTASSIUM CHLORIDE AND SODIUM CHLORIDE 75 ML/HR: 900; 150 INJECTION, SOLUTION INTRAVENOUS at 11:47

## 2019-06-04 RX ADMIN — ONDANSETRON HYDROCHLORIDE 4 MG: 2 SOLUTION INTRAMUSCULAR; INTRAVENOUS at 07:37

## 2019-06-04 RX ADMIN — POTASSIUM CHLORIDE 10 MEQ: 750 CAPSULE, EXTENDED RELEASE ORAL at 17:09

## 2019-06-04 RX ADMIN — GUAIFENESIN 600 MG: 600 TABLET, EXTENDED RELEASE ORAL at 08:26

## 2019-06-04 RX ADMIN — DOCUSATE SODIUM 200 MG: 100 CAPSULE, LIQUID FILLED ORAL at 08:25

## 2019-06-04 RX ADMIN — IPRATROPIUM BROMIDE AND ALBUTEROL SULFATE 3 ML: 2.5; .5 SOLUTION RESPIRATORY (INHALATION) at 19:31

## 2019-06-04 RX ADMIN — Medication 2 MG: at 22:13

## 2019-06-04 RX ADMIN — PANTOPRAZOLE SODIUM 40 MG: 40 TABLET, DELAYED RELEASE ORAL at 17:09

## 2019-06-04 RX ADMIN — PROPRANOLOL HYDROCHLORIDE 80 MG: 40 TABLET ORAL at 21:59

## 2019-06-04 RX ADMIN — IPRATROPIUM BROMIDE AND ALBUTEROL SULFATE 3 ML: 2.5; .5 SOLUTION RESPIRATORY (INHALATION) at 11:04

## 2019-06-04 RX ADMIN — Medication 2 MG: at 00:32

## 2019-06-04 RX ADMIN — GUAIFENESIN 600 MG: 600 TABLET, EXTENDED RELEASE ORAL at 21:59

## 2019-06-04 RX ADMIN — MORPHINE SULFATE 2 MG: 2 INJECTION, SOLUTION INTRAMUSCULAR; INTRAVENOUS at 04:00

## 2019-06-04 RX ADMIN — SUCRALFATE 1 G: 1 SUSPENSION ORAL at 21:59

## 2019-06-04 RX ADMIN — SUCRALFATE 1 G: 1 SUSPENSION ORAL at 17:09

## 2019-06-04 RX ADMIN — LEVOTHYROXINE SODIUM 100 MCG: 100 TABLET ORAL at 06:56

## 2019-06-04 RX ADMIN — MORPHINE SULFATE 2 MG: 2 INJECTION, SOLUTION INTRAMUSCULAR; INTRAVENOUS at 23:18

## 2019-06-04 RX ADMIN — PHENYTOIN SODIUM 300 MG: 100 CAPSULE, EXTENDED RELEASE ORAL at 08:26

## 2019-06-04 RX ADMIN — POTASSIUM CHLORIDE 10 MEQ: 750 CAPSULE, EXTENDED RELEASE ORAL at 08:26

## 2019-06-04 RX ADMIN — ASPIRIN 81 MG: 81 TABLET, CHEWABLE ORAL at 08:26

## 2019-06-04 RX ADMIN — IPRATROPIUM BROMIDE AND ALBUTEROL SULFATE 3 ML: 2.5; .5 SOLUTION RESPIRATORY (INHALATION) at 07:22

## 2019-06-04 NOTE — CONSULTS
Referring Provider: Dr. Ponce   Reason for Consultation: Pain pump    Patient Care Team:  System, Provider Not In as PCP - General  Pedro Encinas MD as PCP - Claims Attributed    Chief complaint Widespread pain    Subjective .     History of present illness: Patient is a 68-year-old white female who has a history of widespread pain from degenerative arthritis as well as degenerative disc disease.  She has been admitted secondary to refractory nausea and vomiting.  She is currently undergoing work-up to find the cause of her current complaints.  She has an implanted Medtronic SynchroMed II pain pump that was implanted in January at [facility].  The pump is currently maintenanced by Dr. Encinas here in [place].  He does not come to [facility].  The patient was supposed to been in his office today for pump refill.  I been asked see the patient to interrogate the pump and determine what medications are needed for pump refill.    Review of Systems  Pertinent items are noted in HPI    History  Past Medical History:   Diagnosis Date   • Arthritis    • Asthma    • Brain aneurysm    • CHF (congestive heart failure) (CMS/HCC)    • COPD (chronic obstructive pulmonary disease) (CMS/HCC)    • Disease of thyroid gland    • GERD (gastroesophageal reflux disease)    • Legally blind    • Migraine    • Seizures (CMS/HCC)    • Stroke (CMS/HCC)      Past Surgical History:   Procedure Laterality Date   • CEREBRAL ANEURYSM REPAIR     •  SECTION     • CHOLECYSTECTOMY     • CRANIOTOMY     • HEMORRHOIDECTOMY     • HERNIA REPAIR     • HYSTERECTOMY     • WRIST SURGERY       Social History     Social History Narrative   • Not on file     Family History   Problem Relation Age of Onset   • Breast cancer Mother    • Colon cancer Cousin    • Colon cancer Maternal Aunt    • Colon cancer Maternal Uncle      Allergies   Allergen Reactions   • Adhesive Tape    • Ambien [Zolpidem] Mental Status Change   • Strawberry Extract  Hives   • Topiramate Other (See Comments)     unknown       Objective     Vital Signs   Temp:  [98.3 °F (36.8 °C)-98.9 °F (37.2 °C)] 98.9 °F (37.2 °C)  Heart Rate:  [53-83] 62  Resp:  [16-20] 16  BP: (134-171)/(67-83) 171/80    Physical Exam:  68-year-old white female well-developed, well-nourished, hydrated in mild discomfort lying in bed.  Alert and oriented x3.   HEENT-atraumatic normocephalic.  Patient is blind   Neck-supple and trachea midline  Cardiopulmonary-grossly unremarkable  Neurologic exam-with exception of blindness, grossly intact  Pump is implanted over the patient's left hip.  It was accessed via telemetry.  As stated, this is a Medtronic SynchroMed II pump model #8637-20.  The pump is currently filled with preservative-free morphine 10 mg/mL and bupivacaine 5 mg/mL.  It is currently infusing 3.597 mg/day.  Currently there is 4.6 mL left in the reservoir.  Low reservoir alarm date is no later than 6/9/2019.  SAUL is October 2024    Results Review:   I reviewed the patient's new clinical results.      Assessment/Plan       Precordial pain      Assessment:  1.  Chronic noncancer pain  2.  History of degenerative disc disease spine with chronic spine pain  3.  Widespread osteoarthritis    Plan:  1.  [facility] delivered to the inpatient pharmacy here for pump refill  2.  I will be able to return 6/6/2019 for pump refill.    I discussed the patients findings and my recommendations with patient    Tyrell Ruiz MD  06/04/19  6:14 PM    Time: More than 50% of time spent in counseling and coordination of care:  Total face-to-face/floor time 30 min.  Time spent in counseling 15 min. Counseling included the following topics: Pump refill

## 2019-06-04 NOTE — PROGRESS NOTES
Emilia Bryant   68 y.o.  female    LOS: 1 day   Patient Care Team:  System, Provider Not In as PCP - General  Pedro Encinas MD as PCP - Claims Attributed      Subjective   C/O abd. pain  Interval History:     Patient Complaints:     Review of Systems:       Medication Review:   Current Facility-Administered Medications:   •  acetaminophen (TYLENOL) suppository 650 mg, 650 mg, Rectal, Q4H PRN, Lemuel Cain MD  •  aspirin chewable tablet 81 mg, 81 mg, Oral, Daily, Brian Rojas MD, 81 mg at 06/04/19 0826  •  baclofen (LIORESAL) tablet 10 mg, 10 mg, Oral, BID PRN, Brian Rojas MD  •  docusate sodium (COLACE) capsule 200 mg, 200 mg, Oral, BID, Dorota Christianson APRN, 200 mg at 06/04/19 0825  •  fluticasone (FLONASE) 50 MCG/ACT nasal spray 1 spray, 1 spray, Each Nare, BID, Dorota Christianson APRN, 1 spray at 06/04/19 0828  •  Glycerin-Hypromellose- (ARTIFICIAL TEARS) 0.2-0.2-1 % ophthalmic solution solution 1 drop, 1 drop, Both Eyes, Q3H PRN, Dorota Christianson APRN  •  guaiFENesin (MUCINEX) 12 hr tablet 600 mg, 600 mg, Oral, Q12H, Brian Rojas MD, 600 mg at 06/04/19 0826  •  ipratropium-albuterol (DUO-NEB) nebulizer solution 3 mL, 3 mL, Nebulization, Q4H - RT, Brian Rojas MD, 3 mL at 06/04/19 0722  •  levothyroxine (SYNTHROID, LEVOTHROID) tablet 100 mcg, 100 mcg, Oral, QAM, Dorota Christianson APRN, 100 mcg at 06/04/19 0656  •  melatonin tablet 2 mg, 2 mg, Oral, Nightly PRN, Shante Mcmillan MD, 2 mg at 06/04/19 0032  •  montelukast (SINGULAIR) tablet 10 mg, 10 mg, Oral, Nightly, Dorota Christianson APRN, 10 mg at 06/03/19 2132  •  morphine injection 2 mg, 2 mg, Intravenous, Q4H PRN, Dorota Christianson, APRN, 2 mg at 06/04/19 0840  •  ondansetron (ZOFRAN) injection 4 mg, 4 mg, Intravenous, Q6H PRN, Brian Rojas MD, 4 mg at 06/04/19 0737  •  pantoprazole (PROTONIX) EC tablet 40 mg, 40 mg, Oral, BID AC, Brian Rojas MD, 40 mg at 06/04/19 0656  •  phenytoin (DILANTIN) ER capsule 300 mg, 300 mg, Oral, Daily, Dorota Christianson,  APRN, 300 mg at 06/04/19 0826  •  potassium chloride (MICRO-K) CR capsule 10 mEq, 10 mEq, Oral, BID With Meals, MeghanDorota duncan, APRN, 10 mEq at 06/04/19 0826  •  propranolol (INDERAL) tablet 80 mg, 80 mg, Oral, TID, Meghan Dorota M, APRN, 80 mg at 06/04/19 0826  •  sodium chloride 0.9 % flush 10 mL, 10 mL, Intravenous, PRN, Elder Dominguez MD  •  sodium chloride 0.9 % with KCl 20 mEq/L infusion, 75 mL/hr, Intravenous, Continuous, Brian Rojas MD, Last Rate: 75 mL/hr at 06/03/19 1852, 75 mL/hr at 06/03/19 1852      Objective   Vital Sign Min/Max for last 24 hours  Temp  Min: 98.3 °F (36.8 °C)  Max: 99.2 °F (37.3 °C)   BP  Min: 134/83  Max: 155/68    Pulse  Min: 60  Max: 83     Wt Readings from Last 3 Encounters:   06/04/19 72.7 kg (160 lb 3.2 oz)   04/24/19 72.1 kg (159 lb)   04/12/19 77.5 kg (170 lb 12.8 oz)        Intake/Output Summary (Last 24 hours) at 6/4/2019 0917  Last data filed at 6/3/2019 1759  Gross per 24 hour   Intake 500 ml   Output --   Net 500 ml     Physical Exam:      General Appearance:    Well developed and well nourished in no acute distress   Head:    Normocephalic, atraumatic   Eyes:            Conjunctivae normal, non icteric, no xanthelasma   Neck:   supple, trachea midline, no thyromegaly, no carotid bruit, no jvd, no elevated cvp   Lungs:     Clear to auscultation,respirations regular, even and                  unlabored    Heart:    Regular rhythm and normal rate, normal S1 and S2,            No murmur, no gallop, no rub, no click   Chest Wall:    No abnormalities observed   Abdomen:     Normal bowel sounds, no masses, no organomegaly, soft        non-tender, non-distended, no guarding, no rebound                tenderness   Rectal:     Deferred   Extremities:   No edema. Moves all extremities well, no cyanosis, no erythema   Pulses:   Pulses palpable and equal bilaterally   Skin:   No bleeding, bruising or rash   Neurologic:   awake alert and oriented x3, speech clear and approp, no  facial drooping     :    Monitor:      Results Review:         Sodium Sodium   Date Value Ref Range Status   06/04/2019 133 (L) 136 - 145 mmol/L Final   06/03/2019 133 (L) 136 - 145 mmol/L Final   06/02/2019 132 (L) 136 - 145 mmol/L Final      Potassium Potassium   Date Value Ref Range Status   06/04/2019 4.3 3.5 - 5.2 mmol/L Final   06/03/2019 4.0 3.5 - 5.2 mmol/L Final   06/02/2019 3.9 3.5 - 5.2 mmol/L Final      Chloride Chloride   Date Value Ref Range Status   06/04/2019 99 98 - 107 mmol/L Final   06/03/2019 95 (L) 98 - 107 mmol/L Final   06/02/2019 95 (L) 98 - 107 mmol/L Final      Bicarbonate No results found for: PLASMABICARB   BUN BUN   Date Value Ref Range Status   06/04/2019 10 8 - 23 mg/dL Final   06/03/2019 15 8 - 23 mg/dL Final   06/02/2019 18 8 - 23 mg/dL Final      Creatinine Creatinine   Date Value Ref Range Status   06/04/2019 0.88 0.57 - 1.00 mg/dL Final   06/03/2019 1.05 (H) 0.57 - 1.00 mg/dL Final   06/02/2019 1.11 (H) 0.57 - 1.00 mg/dL Final      Calcium Calcium   Date Value Ref Range Status   06/04/2019 8.8 8.6 - 10.5 mg/dL Final   06/03/2019 8.8 8.6 - 10.5 mg/dL Final   06/02/2019 8.1 (L) 8.6 - 10.5 mg/dL Final      Magnesium No results found for: MG     Results from last 7 days   Lab Units 06/04/19  0513   WBC 10*3/mm3 5.94   HEMOGLOBIN g/dL 10.0*   HEMATOCRIT % 31.4*   PLATELETS 10*3/mm3 256     Lab Results   Lab Value Date/Time    TROPONINT <0.010 06/03/2019 0147    TROPONINT <0.010 06/02/2019 2222    TROPONINT <0.010 06/02/2019 2218    TROPONINT <0.010 04/24/2019 2033    TROPONINT <0.010 04/24/2019 1832    TROPONINT <0.010 04/12/2019 1915    TROPONINT <0.010 04/12/2019 1624    TROPONINT <0.010 03/29/2019 0555    TROPONINT <0.010 03/28/2019 0432     Lab Results   Component Value Date    CHOL 199 06/04/2019     Lab Results   Component Value Date    HDL 78 (H) 06/04/2019     Lab Results   Component Value Date     (H) 06/04/2019     Lab Results   Component Value Date    TRIG 93  06/04/2019     No components found for: CHOLHDL  No results found for: PTT  No components found for: PT/INR  Lab Results   Component Value Date    HGBA1C 5.30 06/04/2019      Lab Results   Component Value Date    TSH 1.630 06/04/2019        Echo EF Estimated  No results found for: ECHOEFEST  CT ABDOMEN PELVIS WO CONTRAST-     CLINICAL HISTORY: Nausea and vomiting. Diarrhea.     TECHNIQUE: Spiral CT images were acquired through the abdomen and pelvis  with no oral or IV contrast and were reconstructed in 3 mm thick slices.     Radiation dose reduction techniques were utilized, including automated  exposure control and exposure modulation based on body size.     COMPARISON: CT scan of the abdomen and pelvis dated 04/01/2019.     FINDINGS: The liver, spleen, pancreas, and adrenal glands appear within  normal limits. The gallbladder is absent. There are couple small simple  cyst in the left kidney. The kidneys are otherwise unremarkable. There  is moderate size fixed hiatal hernia. The small bowel and colon are  unremarkable except for a few diverticuli in the sigmoid colon. There is  no CT evidence of diverticulitis. The uterus is absent. An implanted  drug delivery device is noted in the left gluteal region. An epidural  catheter is in place with its tip at the T10 level.     IMPRESSION:  Minimal sigmoid diverticulosis without evidence of  diverticulitis. Moderate size fixed hiatal hernia. Postoperative changes  as noted. Otherwise unremarkable CT scan of the abdomen and pelvis.     This report was finalized on 6/3/2019 8:17 PM by Dr. Hector Nugent M.D.            Assessment/ Plan    Active Hospital Problems    Diagnosis POA   • Precordial pain [R07.2] Yes       Non cardiac discomfort  Trop neg x3  cxr and ct chest neg     Nausea/vomiting- consult IM     H/o cva     H/o aneurysms and curr has 2 by history    GI gabe Xiao MD  06/04/19  9:17 AM

## 2019-06-04 NOTE — PLAN OF CARE
Problem: Patient Care Overview  Goal: Plan of Care Review  Outcome: Ongoing (interventions implemented as appropriate)   06/04/19 1630 06/04/19 1812   Coping/Psychosocial   Plan of Care Reviewed With patient --    Plan of Care Review   Progress --  improving   OTHER   Outcome Summary --  Pt has continued with nausea, zofran prn. Carafate given. Morphine IV prn for breakthrough pain from pt's pain pump. Pain management consulted d/t pt missing appointment for pump refill today.

## 2019-06-04 NOTE — PROGRESS NOTES
[facility], [place], operated by [facility]/[place]              Initial Inpatient Consult Note  Referring Provider: Payton  Reason for Consultation: Nausea.    Subjective     History of present illness: Patient was in her usual state of health until just the past couple of days when she had abrupt onset of nausea, upper abdominal distress which radiated into her chest, and chills.  She did not experience myalgias or arthralgias.  She has no idea what might of brought this upon her.  She is feeling a bit better today than she was yesterday.  When she initially presented for medical attention it was feared that she might be having a myocardial event but right now does not seem to be any evidence of that.  She has had a prior cholecystectomy.  She has a history of hiatal hernia.  Although she has had a hiatal hernia repair, she had evidence of a recurrent hernia at the time of admission on the CT scan.    Past Medical History:  Past Medical History:   Diagnosis Date   • Arthritis    • Asthma    • Brain aneurysm    • CHF (congestive heart failure) (CMS/HCC)    • COPD (chronic obstructive pulmonary disease) (CMS/HCC)    • Disease of thyroid gland    • GERD (gastroesophageal reflux disease)    • Legally blind    • Migraine    • Seizures (CMS/HCC)    • Stroke (CMS/HCC)        Past Surgical History:  Past Surgical History:   Procedure Laterality Date   • CEREBRAL ANEURYSM REPAIR     •  SECTION     • CHOLECYSTECTOMY     • CRANIOTOMY     • HEMORRHOIDECTOMY     • HERNIA REPAIR     • HYSTERECTOMY     • WRIST SURGERY          Social History:   Social History     Tobacco Use   • Smoking status: Never Smoker   • Smokeless tobacco: Never Used   Substance Use Topics   • Alcohol use: No        Family History:  Family History   Problem Relation Age of Onset   • Breast cancer Mother    • Colon cancer Cousin    • Colon cancer Maternal Aunt    • Colon cancer Maternal Uncle        Home Meds:  Medications Prior to Admission    Medication Sig Dispense Refill Last Dose   • azithromycin (ZITHROMAX) 250 MG tablet Take 250 mg by mouth Daily. Take 2 tablets the first day, then 1 tablet daily for 4 days.   Past Week at Unknown time   • baclofen (LIORESAL) 10 MG tablet take 1 tablet by mouth twice a day (LATE AFTERNOON AND BEDTIME)  0 Past Week at Unknown time   • fluticasone (FLONASE) 50 MCG/ACT nasal spray   0 Past Week at Unknown time   • Fluticasone-Umeclidin-Vilant (TRELEGY ELLIPTA) 100-62.5-25 MCG/INH aerosol powder  Inhale 1 each Daily.   Past Week at Unknown time   • furosemide (LASIX) 40 MG tablet 40 mg 2 (Two) Times a Day.  0 Past Week at Unknown time   • levothyroxine (SYNTHROID, LEVOTHROID) 100 MCG tablet 100 mcg Daily.  0 Past Week at Unknown time   • meclizine (ANTIVERT) 25 MG tablet Take 25 mg by mouth 2 (Two) Times a Day As Needed for dizziness.      • montelukast (SINGULAIR) 10 MG tablet 10 mg Every Night.  0 Past Week at Unknown time   • O2 (OXYGEN) Inhale 2 L/min 1 (One) Time.   6/3/2019 at Unknown time   • omeprazole (priLOSEC) 40 MG capsule take 1 capsule by mouth twice a day  0 Past Week at Unknown time   • ondansetron (ZOFRAN) 8 MG tablet take 1 tablet by mouth three times a day PRN  0 Past Week at Unknown time   • phenytoin (DILANTIN) 100 MG ER capsule take 3 capsules by mouth at bedtime  0 Past Week at Unknown time   • potassium chloride (K-DUR) 10 MEQ CR tablet 10 mEq 2 (Two) Times a Day.  0 Past Week at Unknown time   • pramipexole (MIRAPEX) 0.25 MG tablet Take 0.25 mg by mouth At Night As Needed (Take one or two tablets at bedtime as needed.).   Past Week at Unknown time   • propranolol (INDERAL) 20 MG tablet Take 20 mg by mouth 2 (Two) Times a Day.   Past Week at Unknown time   • propranolol (INDERAL) 80 MG tablet 80 mg 3 (Three) Times a Day.  0 Past Week at Unknown time   • topiramate (TOPAMAX) 25 MG tablet Take 25 mg by mouth 2 (Two) Times a Day. Take two tablets in the morning and two tablets at bedtime.   Past  Week at Unknown time   • ARTIFICIAL TEARS 1.4 % ophthalmic solution   0    • azelastine (ASTELIN) 0.1 % nasal spray instill 1 spray into each nostril twice a day  0 3/27/2019 at Unknown time   • INCRUSE ELLIPTA 62.5 MCG/INH aerosol powder    0 3/27/2019 at Unknown time       Current Meds:     aspirin 81 mg Oral Daily   docusate sodium 200 mg Oral BID   fluticasone 1 spray Each Nare BID   guaiFENesin 600 mg Oral Q12H   ipratropium-albuterol 3 mL Nebulization Q4H - RT   levothyroxine 100 mcg Oral QAM   montelukast 10 mg Oral Nightly   pantoprazole 40 mg Oral BID AC   phenytoin 300 mg Oral Daily   potassium chloride 10 mEq Oral BID With Meals   propranolol 80 mg Oral TID   sucralfate 1 g Oral 4x Daily AC & at Bedtime       Allergies:  Allergies   Allergen Reactions   • Adhesive Tape    • Ambien [Zolpidem] Mental Status Change   • Strawberry Extract Hives   • Topiramate Other (See Comments)     unknown       Review of Systems  Pertinent items are noted in HPI, all other systems reviewed and negative    Objective     Vital Signs  Temp:  [98.3 °F (36.8 °C)-98.9 °F (37.2 °C)] 98.3 °F (36.8 °C)  Heart Rate:  [53-83] 65  Resp:  [16-20] 16  BP: (134-151)/(67-83) 144/71    Physical Exam:     General Appearance:    Alert, cooperative, in no acute distress   Head:    Normocephalic, without obvious abnormality, atraumatic   Eyes:            Lids and lashes normal, conjunctivae and sclerae normal, no   icterus, no pallor, corneas clear, PERRLA   Ears:    Ears appear intact with no abnormalities noted   Throat:   No oral lesions, no thrush, oral mucosa moist   Neck:   No adenopathy, supple, trachea midline, no thyromegaly, no     carotid bruit, no JVD   Back:     No kyphosis present, no scoliosis present, no skin lesions,       erythema or scars, no tenderness to percussion or                   palpation,   range of motion normal   Lungs:     Clear to auscultation,respirations regular, even and                   unlabored    Heart:     Regular rhythm and normal rate, normal S1 and S2, no            murmur, no gallop, no rub, no click   Breast Exam:    Deferred   Abdomen:     Normal bowel sounds, no masses, no organomegaly, soft        non-tender, non-distended, no guarding, no rebound                 tenderness   Genitalia:    Deferred   Extremities:   Moves all extremities well, no edema, no cyanosis, no              redness   Pulses:   Pulses palpable and equal bilaterally   Skin:   No bleeding, bruising or rash   Lymph nodes:   No palpable adenopathy   Neurologic:   Cranial nerves 2 - 12 grossly intact, sensation intact, DTR        present and equal bilaterally       Results Review:   I reviewed the patient's new clinical results.    WBC No results found for: WBCS   HGB Hemoglobin   Date Value Ref Range Status   06/04/2019 10.0 (L) 12.0 - 15.9 g/dL Final   06/02/2019 10.8 (L) 12.0 - 15.9 g/dL Final      HCT Hematocrit   Date Value Ref Range Status   06/04/2019 31.4 (L) 34.0 - 46.6 % Final   06/02/2019 33.5 (L) 34.0 - 46.6 % Final      Platelets No results found for: LABPLAT   MCV MCV   Date Value Ref Range Status   06/04/2019 94.0 79.0 - 97.0 fL Final   06/02/2019 91.5 79.0 - 97.0 fL Final          Sodium Sodium   Date Value Ref Range Status   06/04/2019 133 (L) 136 - 145 mmol/L Final   06/03/2019 133 (L) 136 - 145 mmol/L Final   06/02/2019 132 (L) 136 - 145 mmol/L Final      Potassium Potassium   Date Value Ref Range Status   06/04/2019 4.3 3.5 - 5.2 mmol/L Final   06/03/2019 4.0 3.5 - 5.2 mmol/L Final   06/02/2019 3.9 3.5 - 5.2 mmol/L Final      Chloride Chloride   Date Value Ref Range Status   06/04/2019 99 98 - 107 mmol/L Final   06/03/2019 95 (L) 98 - 107 mmol/L Final   06/02/2019 95 (L) 98 - 107 mmol/L Final      CO2 CO2   Date Value Ref Range Status   06/04/2019 24.8 22.0 - 29.0 mmol/L Final   06/03/2019 21.6 (L) 22.0 - 29.0 mmol/L Final   06/02/2019 24.2 22.0 - 29.0 mmol/L Final      BUN BUN   Date Value Ref Range Status   06/04/2019  10 8 - 23 mg/dL Final   06/03/2019 15 8 - 23 mg/dL Final   06/02/2019 18 8 - 23 mg/dL Final      Creatinine Creatinine   Date Value Ref Range Status   06/04/2019 0.88 0.57 - 1.00 mg/dL Final   06/03/2019 1.05 (H) 0.57 - 1.00 mg/dL Final   06/02/2019 1.11 (H) 0.57 - 1.00 mg/dL Final      Calcium Calcium   Date Value Ref Range Status   06/04/2019 8.8 8.6 - 10.5 mg/dL Final   06/03/2019 8.8 8.6 - 10.5 mg/dL Final   06/02/2019 8.1 (L) 8.6 - 10.5 mg/dL Final      Albumin Albumin   Date Value Ref Range Status   06/04/2019 3.30 (L) 3.50 - 5.20 g/dL Final   06/02/2019 3.50 3.50 - 5.20 g/dL Final      AST  ALT  PT/INR:   AST (SGOT)   Date Value Ref Range Status   06/04/2019 18 1 - 32 U/L Final   06/02/2019 14 1 - 32 U/L Final     ALT (SGPT)   Date Value Ref Range Status   06/04/2019 11 1 - 33 U/L Final   06/02/2019 8 1 - 33 U/L Final     No results found for: PROTIME/No results found for: INR         Imaging Results (last 72 hours)     Procedure Component Value Units Date/Time    CT Head Without Contrast [[id number]] Collected:  06/03/19 1153     Updated:  06/04/19 1055    Narrative:       CT HEAD WITHOUT CONTRAST      HISTORY:  Headache.     COMPARISON: CT head 03/28/2019.     A right frontoparietal and temporal craniotomy is noted. Surgical clips  are identified adjacent to the posterior clinoid on the right. There is  encephalomalacia appreciated involving the right temporal lobe  anteriorly and right frontal lobe inferolaterally similar to 03/28/2019.     There is increased attenuation superior to the clips measuring 10 mm in  AP dimension suggestive of enlargement of the ICA terminus. This appears  similar to minimally more prominent as compared to the CT examination of  02/14/2005, but unchanged versus 03/28/2019 grossly. There is no  evidence of subarachnoid hemorrhage, hydrocephalus or of abnormal  extra-axial fluid. No focal area of decreased attenuation to suggest  acute infarction is identified. Further evaluation  could be performed  with an MRI/MRA of the brain or with a conventional catheter directed  arteriogram as indicated. The above information was called to the  patient's nurse at the time of the dictation. The patient's nurse is to  immediately relay the information to the clinical service.            Radiation dose reduction techniques were utilized, including automated  exposure control and exposure modulation based on body size.     This report was finalized on 6/4/2019 10:51 AM by Dr. Ramirez Caldwell M.D.       CT Abdomen Pelvis Without Contrast [[id number]] Collected:  06/03/19 2013     Updated:  06/03/19 2020    Narrative:       CT ABDOMEN PELVIS WO CONTRAST-     CLINICAL HISTORY: Nausea and vomiting. Diarrhea.     TECHNIQUE: Spiral CT images were acquired through the abdomen and pelvis  with no oral or IV contrast and were reconstructed in 3 mm thick slices.     Radiation dose reduction techniques were utilized, including automated  exposure control and exposure modulation based on body size.     COMPARISON: CT scan of the abdomen and pelvis dated 04/01/2019.     FINDINGS: The liver, spleen, pancreas, and adrenal glands appear within  normal limits. The gallbladder is absent. There are couple small simple  cyst in the left kidney. The kidneys are otherwise unremarkable. There  is moderate size fixed hiatal hernia. The small bowel and colon are  unremarkable except for a few diverticuli in the sigmoid colon. There is  no CT evidence of diverticulitis. The uterus is absent. An implanted  drug delivery device is noted in the left gluteal region. An epidural  catheter is in place with its tip at the T10 level.       Impression:       Minimal sigmoid diverticulosis without evidence of  diverticulitis. Moderate size fixed hiatal hernia. Postoperative changes  as noted. Otherwise unremarkable CT scan of the abdomen and pelvis.     This report was finalized on 6/3/2019 8:17 PM by Dr. Hector Nugent M.D.       XR Abdomen  KUB [[id number]] Collected:  06/03/19 1158     Updated:  06/03/19 1204    Narrative:       XR ABDOMEN KUB-     INDICATIONS: Nausea, vomiting     TECHNIQUE: SUPINE VIEWS OF THE ABDOMEN     COMPARISON: The  image from CT of 04/01/2019     FINDINGS:      The bowel gas pattern is nonobstructive. Mild colonic fecal retention.  No supine evidence for free peritoneal gas. Pain pump device projects at  the level of the left abdomen. No definite nephrolithiasis. Nonspecific  soft tissue calcifications in the pelvis. Residual contrast material is  seen in the urinary bladder. If there is further clinical concern, CT  can be obtained for further examination.       Impression:          As described.     This report was finalized on 6/3/2019 12:01 PM by Dr. Angel Benitez M.D.       CT Angiogram Chest With Contrast [[id number]] Collected:  06/03/19 0409     Updated:  06/03/19 0416    Narrative:       CT ANGIOGRAM THORAX WITH CONTRAST, PULMONARY EMBOLISM PROTOCOL     HISTORY: Pulmonary embolism.     COMPARISON: 04/12/2019.     TECHNIQUE: Radiation dose reduction techniques were utilized, including  automated exposure control and exposure modulation based on body size.  Axial contrast-enhanced images of the chest were obtained according to  the pulmonary embolism protocol. Coronal oblique 3-D MIP reformatted  images were supplemented and reviewed.  100 mls of non ionic contrast  was utilized intravenously.     FINDINGS CHEST CT: Lungs clear without active airspace disease,  effusion, or pulmonary embolism. Aorta nonaneurysmal. Stable moderate  hiatal hernia. There is a low density left renal lesion which is  partially imaged and most likely reflects a small cyst             Impression:       1. No active disease or pulmonary embolism.  2. Stable moderate hiatal hernia.  3. Partial visualization of a suspected left renal cyst     This report was finalized on 6/3/2019 4:13 AM by Lemuel Shepard M.D.       XR Chest 2 View  [[id number]] Collected:  06/02/19 2325     Updated:  06/02/19 2330    Narrative:       PA AND LATERAL CHEST X-RAY     CLINICAL HISTORY: Chest Pain Triage Protocol     COMPARISON: 04/24/2019.     FINDINGS: PA and lateral views of the chest were obtained. The lungs are  well expanded and appear clear. Borderline cardiomegaly, stable. Stable  retrocardiac opacity correlating to a known hiatal hernia. Vascularity  is normal. No pleural effusions.             Impression:       1. No active disease.     This report was finalized on 6/2/2019 11:27 PM by Lemuel Shepard M.D.             Assessment/Plan       Precordial pain      The abrupt onset of this condition sounds almost viral in origin.  I think she is going to need to undergo EGD in the next day or 2 to sort all of this out.  By the way, her alkaline phosphatase is mildly and chronically elevated.  This is due to Dilantin effect and is not the sign of any significant hepatobiliary disorder.    I discussed the patients findings and my recommendations with patient    Jovanni Chavez MD  [facility]  06/04/19  3:45 PM

## 2019-06-04 NOTE — PLAN OF CARE
Problem: Patient Care Overview  Goal: Plan of Care Review  Outcome: Ongoing (interventions implemented as appropriate)   06/04/19 0418   Coping/Psychosocial   Plan of Care Reviewed With patient   Plan of Care Review   Progress improving   OTHER   Outcome Summary Pain treated with prn morphine, n/v treated with zofran. 20 K+ NS gtt infusing. full liquid diet. Will continue to monitor      Goal: Individualization and Mutuality  Outcome: Ongoing (interventions implemented as appropriate)    Goal: Discharge Needs Assessment  Outcome: Ongoing (interventions implemented as appropriate)    Goal: Interprofessional Rounds/Family Conf  Outcome: Ongoing (interventions implemented as appropriate)      Problem: Cardiac: ACS (Acute Coronary Syndrome) (Adult)  Goal: Signs and Symptoms of Listed Potential Problems Will be Absent, Minimized or Managed (Cardiac: ACS)  Outcome: Ongoing (interventions implemented as appropriate)      Problem: Fall Risk (Adult)  Goal: Identify Related Risk Factors and Signs and Symptoms  Outcome: Ongoing (interventions implemented as appropriate)    Goal: Absence of Fall  Outcome: Ongoing (interventions implemented as appropriate)      Problem: Pain, Chronic (Adult)  Goal: Identify Related Risk Factors and Signs and Symptoms  Outcome: Ongoing (interventions implemented as appropriate)    Goal: Acceptable Pain/Comfort Level and Functional Ability  Outcome: Ongoing (interventions implemented as appropriate)

## 2019-06-04 NOTE — PROGRESS NOTES
"Daily progress note    Chief complaint  Doing same  Complain of abdominal pain  No chest pain shortness of breath or palpitation    History of present illness  68-year-old white female with history of COPD congestive heart failure hypothyroidism seizure disorder and gastroesophageal reflux presented to [facility] emergency room with chest pain that started yesterday evening.  Patient described pain as a constant which improved with nitro.  Patient denies any associated symptoms including shortness of breath nausea vomiting or diaphoresis.  Patient does have some chills but no fever also complained of headache and described pain as a tightness and constant no relationship with food exertion or movement.  Patient evaluated in ER and initial work-up negative admit to cardiology service and I am asked to follow the patient for medical problem.  At the time of interview patient still hurting but no respiratory distress.     REVIEW OF SYSTEMS  Constitutional: Positive for chills. Negative for fever.   HENT: Negative for sore throat.    Eyes: Negative.    Respiratory: Positive for shortness of breath. Negative for cough.    Cardiovascular: Positive for chest pain.   Gastrointestinal: Positive for nausea and vomiting. Negative for abdominal pain and diarrhea.   Genitourinary: Negative for dysuria.   Musculoskeletal: Negative for neck pain.   Skin: Negative for rash.   Allergic/Immunologic: Negative.    Neurological: Negative for weakness, numbness and headaches.   Hematological: Negative.    Psychiatric/Behavioral: Negative.    All other systems reviewed and are negative.     PHYSICAL EXAM  Blood pressure 144/71, pulse 56, temperature 98.3 °F (36.8 °C), temperature source Oral, resp. rate 20, height 167.6 cm (66\"), weight 72.7 kg (160 lb 3.2 oz), SpO2 100 %.    Constitutional: She is oriented to person, place, and time. No distress.   Head: Normocephalic and atraumatic.   Eyes: EOM are normal. Pupils are equal, round, " and reactive to light.   Neck: Normal range of motion. Neck supple.   Cardiovascular: Normal rate and regular rhythm. Murmur heard.  Pulmonary/Chest: Effort normal. No respiratory distress. She has rhonchi (mild).   Abdominal: Soft. There is no tenderness. There is no rebound and no guarding.   Musculoskeletal: Normal range of motion. She exhibits no edema.   Neurological: She is alert and oriented to person, place, and time. She has normal sensation and normal strength.   Skin: Skin is warm and dry. No rash noted.   Psychiatric: Mood and affect normal.     LAB RESULTS  Lab Results (last 24 hours)     Procedure Component Value Units Date/Time    BNP [[id number]]  (Normal) Collected:  06/04/19 0513    Specimen:  Blood Updated:  06/04/19 0701     proBNP 858.1 pg/mL     Narrative:       Among patients with dyspnea, NT-proBNP is highly sensitive for the detection of acute congestive heart failure. In addition NT-proBNP of <300 pg/ml effectively rules out acute congestive heart failure with 99% negative predictive value.    TSH [[id number]]  (Normal) Collected:  06/04/19 0513    Specimen:  Blood Updated:  06/04/19 0701     TSH 1.630 mIU/mL     Phenytoin Level, Total [[id number]]  (Normal) Collected:  06/04/19 0513    Specimen:  Blood Updated:  06/04/19 0651     Phenytoin Level 11.7 mcg/mL     Comprehensive Metabolic Panel [[id number]]  (Abnormal) Collected:  06/04/19 0513    Specimen:  Blood Updated:  06/04/19 0649     Glucose 94 mg/dL      BUN 10 mg/dL      Creatinine 0.88 mg/dL      Sodium 133 mmol/L      Potassium 4.3 mmol/L      Chloride 99 mmol/L      CO2 24.8 mmol/L      Calcium 8.8 mg/dL      Total Protein 5.8 g/dL      Albumin 3.30 g/dL      ALT (SGPT) 11 U/L      AST (SGOT) 18 U/L      Alkaline Phosphatase 127 U/L      Total Bilirubin 0.2 mg/dL      eGFR Non African Amer 64 mL/min/1.73      Globulin 2.5 gm/dL      A/G Ratio 1.3 g/dL      BUN/Creatinine Ratio 11.4     Anion Gap 9.2 mmol/L     Narrative:       GFR  Normal >60  Chronic Kidney Disease <60  Kidney Failure <15    Lipid Panel [[id number]]  (Abnormal) Collected:  06/04/19 0513    Specimen:  Blood Updated:  06/04/19 0649     Total Cholesterol 199 mg/dL      Triglycerides 93 mg/dL      HDL Cholesterol 78 mg/dL      LDL Cholesterol  102 mg/dL      VLDL Cholesterol 18.6 mg/dL      LDL/HDL Ratio 1.31    Narrative:       Cholesterol Reference Ranges  (U.S. Department of Health and Human Services ATP III Classifications)    Desirable          <200 mg/dL  Borderline High    200-239 mg/dL  High Risk          >240 mg/dL      Triglyceride Reference Ranges  (U.S. Department of Health and Human Services ATP III Classifications)    Normal           <150 mg/dL  Borderline High  150-199 mg/dL  High             200-499 mg/dL  Very High        >500 mg/dL    HDL Reference Ranges  (U.S. Department of Health and Human Services ATP III Classifcations)    Low     <40 mg/dl (major risk factor for CHD)  High    >60 mg/dl ('negative' risk factor for CHD)        LDL Reference Ranges  (U.S. Department of Health and Human Services ATP III Classifcations)    Optimal          <100 mg/dL  Near Optimal     100-129 mg/dL  Borderline High  130-159 mg/dL  High             160-189 mg/dL  Very High        >189 mg/dL    Hemoglobin A1c [[id number]]  (Normal) Collected:  06/04/19 0513    Specimen:  Blood Updated:  06/04/19 0623     Hemoglobin A1C 5.30 %     Narrative:       Hemoglobin A1C Ranges:    Increased Risk for Diabetes  5.7% to 6.4%  Diabetes                     >= 6.5%  Diabetic Goal                < 7.0%    CBC & Differential [[id number]] Collected:  06/04/19 0513    Specimen:  Blood Updated:  06/04/19 0616    Narrative:       The following orders were created for panel order CBC & Differential.  Procedure                               Abnormality         Status                     ---------                               -----------         ------                     CBC Auto Differential[[id number]]         Abnormal            Final result                 Please view results for these tests on the individual orders.    CBC Auto Differential [[id number]]  (Abnormal) Collected:  06/04/19 0513    Specimen:  Blood Updated:  06/04/19 0616     WBC 5.94 10*3/mm3      RBC 3.34 10*6/mm3      Hemoglobin 10.0 g/dL      Hematocrit 31.4 %      MCV 94.0 fL      MCH 29.9 pg      MCHC 31.8 g/dL      RDW 13.1 %      RDW-SD 45.4 fl      MPV 9.0 fL      Platelets 256 10*3/mm3      Neutrophil % 61.6 %      Lymphocyte % 21.9 %      Monocyte % 12.5 %      Eosinophil % 2.9 %      Basophil % 0.8 %      Immature Grans % 0.3 %      Neutrophils, Absolute 3.66 10*3/mm3      Lymphocytes, Absolute 1.30 10*3/mm3      Monocytes, Absolute 0.74 10*3/mm3      Eosinophils, Absolute 0.17 10*3/mm3      Basophils, Absolute 0.05 10*3/mm3      Immature Grans, Absolute 0.02 10*3/mm3      nRBC 0.0 /100 WBC         Imaging Results (last 24 hours)     Procedure Component Value Units Date/Time    CT Head Without Contrast [[id number]] Collected:  06/03/19 1153     Updated:  06/04/19 1055    Narrative:       CT HEAD WITHOUT CONTRAST      HISTORY:  Headache.     COMPARISON: CT head 03/28/2019.     A right frontoparietal and temporal craniotomy is noted. Surgical clips  are identified adjacent to the posterior clinoid on the right. There is  encephalomalacia appreciated involving the right temporal lobe  anteriorly and right frontal lobe inferolaterally similar to 03/28/2019.     There is increased attenuation superior to the clips measuring 10 mm in  AP dimension suggestive of enlargement of the ICA terminus. This appears  similar to minimally more prominent as compared to the CT examination of  02/14/2005, but unchanged versus 03/28/2019 grossly. There is no  evidence of subarachnoid hemorrhage, hydrocephalus or of abnormal  extra-axial fluid. No focal area of decreased attenuation to suggest  acute infarction is identified. Further evaluation could be  performed  with an MRI/MRA of the brain or with a conventional catheter directed  arteriogram as indicated. The above information was called to the  patient's nurse at the time of the dictation. The patient's nurse is to  immediately relay the information to the clinical service.            Radiation dose reduction techniques were utilized, including automated  exposure control and exposure modulation based on body size.     This report was finalized on 6/4/2019 10:51 AM by Dr. Ramirez Caldwell M.D.       CT Abdomen Pelvis Without Contrast [[id number]] Collected:  06/03/19 2013     Updated:  06/03/19 2020    Narrative:       CT ABDOMEN PELVIS WO CONTRAST-     CLINICAL HISTORY: Nausea and vomiting. Diarrhea.     TECHNIQUE: Spiral CT images were acquired through the abdomen and pelvis  with no oral or IV contrast and were reconstructed in 3 mm thick slices.     Radiation dose reduction techniques were utilized, including automated  exposure control and exposure modulation based on body size.     COMPARISON: CT scan of the abdomen and pelvis dated 04/01/2019.     FINDINGS: The liver, spleen, pancreas, and adrenal glands appear within  normal limits. The gallbladder is absent. There are couple small simple  cyst in the left kidney. The kidneys are otherwise unremarkable. There  is moderate size fixed hiatal hernia. The small bowel and colon are  unremarkable except for a few diverticuli in the sigmoid colon. There is  no CT evidence of diverticulitis. The uterus is absent. An implanted  drug delivery device is noted in the left gluteal region. An epidural  catheter is in place with its tip at the T10 level.       Impression:       Minimal sigmoid diverticulosis without evidence of  diverticulitis. Moderate size fixed hiatal hernia. Postoperative changes  as noted. Otherwise unremarkable CT scan of the abdomen and pelvis.     This report was finalized on 6/3/2019 8:17 PM by Dr. Hector Nugent M.D.           EKG                               Rhythm/Rate: NSR, 68  P waves and IN: Normal  QRS, axis: LAD, no QRS conduction delay  ST and T waves: No acute ST changes       Current Facility-Administered Medications:   •  acetaminophen (TYLENOL) suppository 650 mg, 650 mg, Rectal, Q4H PRN, Lemuel Cain MD  •  aspirin chewable tablet 81 mg, 81 mg, Oral, Daily, Brian Rojas MD, 81 mg at 06/04/19 0826  •  baclofen (LIORESAL) tablet 10 mg, 10 mg, Oral, BID PRN, Brian Rojas MD  •  docusate sodium (COLACE) capsule 200 mg, 200 mg, Oral, BID, Dorota Christianson, APRN, 200 mg at 06/04/19 0825  •  fluticasone (FLONASE) 50 MCG/ACT nasal spray 1 spray, 1 spray, Each Nare, BID, Dorota Christianson APRN, 1 spray at 06/04/19 0828  •  Glycerin-Hypromellose- (ARTIFICIAL TEARS) 0.2-0.2-1 % ophthalmic solution solution 1 drop, 1 drop, Both Eyes, Q3H PRN, Dorota Christianson, APRN  •  guaiFENesin (MUCINEX) 12 hr tablet 600 mg, 600 mg, Oral, Q12H, Brian Rojas MD, 600 mg at 06/04/19 0826  •  ipratropium-albuterol (DUO-NEB) nebulizer solution 3 mL, 3 mL, Nebulization, Q4H - RT, Brian Rojas MD, 3 mL at 06/04/19 1104  •  levothyroxine (SYNTHROID, LEVOTHROID) tablet 100 mcg, 100 mcg, Oral, QAM, Dorota Christianson, APRN, 100 mcg at 06/04/19 0656  •  melatonin tablet 2 mg, 2 mg, Oral, Nightly PRN, Shante Mcmillan MD, 2 mg at 06/04/19 0032  •  montelukast (SINGULAIR) tablet 10 mg, 10 mg, Oral, Nightly, Dorota Christianson, APRN, 10 mg at 06/03/19 2132  •  morphine injection 2 mg, 2 mg, Intravenous, Q4H PRN, Dorota Christianson APRN, 2 mg at 06/04/19 0840  •  ondansetron (ZOFRAN) injection 4 mg, 4 mg, Intravenous, Q6H PRN, Brian Rojas MD, 4 mg at 06/04/19 0737  •  pantoprazole (PROTONIX) EC tablet 40 mg, 40 mg, Oral, BID AC, Brian Rojas MD, 40 mg at 06/04/19 0656  •  phenytoin (DILANTIN) ER capsule 300 mg, 300 mg, Oral, Daily, Dorota Christianson, FELIPE, 300 mg at 06/04/19 0826  •  potassium chloride (MICRO-K) CR capsule 10 mEq, 10 mEq, Oral, BID With Meals, Dorota Christianson,  APRN, 10 mEq at 06/04/19 0826  •  propranolol (INDERAL) tablet 80 mg, 80 mg, Oral, TID, Dorota Christianson, APRN, 80 mg at 06/04/19 0826  •  sodium chloride 0.9 % flush 10 mL, 10 mL, Intravenous, PRN, Elder Dominguez MD  •  sodium chloride 0.9 % with KCl 20 mEq/L infusion, 75 mL/hr, Intravenous, Continuous, Suzy Rojas MD, Last Rate: 75 mL/hr at 06/04/19 1147, 75 mL/hr at 06/04/19 1147     ASSESSMENT  Chest pain resolved  Abdominal pain  Diverticulosis  Hypertension  Hyperlipidemia  Hypothyroidism  COPD  Seizure disorder  Chronic anemia  Gastroesophageal reflux disease    PLAN  CPM  IV fluid  Protonix 40 p.o. twice daily and Carafate 1 g p.o. QID  Agree with GI consult  Continue home medications  Stress ulcer DVT prophylaxis  Supportive care  PT/OT  We will follow with  and further recommendation according to hospital course    SUZY ROJAS MD

## 2019-06-04 NOTE — PLAN OF CARE
Problem: Patient Care Overview  Goal: Plan of Care Review  Outcome: Ongoing (interventions implemented as appropriate)   06/03/19 1957   Coping/Psychosocial   Plan of Care Reviewed With patient   Plan of Care Review   Progress no change   OTHER   Outcome Summary Pt c/o LLQ pain and shoulder/hip (chronic) pain. Medicated with IV pain meds. Nauseated but no vomiting. IV Zofran given. Pt reports some improvement, still nauseated, but able to tolerate oral fluids. 20 K+ NS gtt infusing. Waiting on CT abdomen results. Will continue to monitor.         Problem: Cardiac: ACS (Acute Coronary Syndrome) (Adult)  Goal: Signs and Symptoms of Listed Potential Problems Will be Absent, Minimized or Managed (Cardiac: ACS)  Outcome: Ongoing (interventions implemented as appropriate)   06/03/19 1957   Goal/Outcome Evaluation   Problems Assessed (Acute Coronary Syndrome) all   Problems Present (Acute Coronary Syn) none       Problem: Fall Risk (Adult)  Goal: Identify Related Risk Factors and Signs and Symptoms  Outcome: Ongoing (interventions implemented as appropriate)   06/03/19 1957   Fall Risk (Adult)   Related Risk Factors (Fall Risk) age-related changes;fatigue/slow reaction;sleep pattern alteration;environment unfamiliar;sensory deficits;slippery/uneven surfaces;impaired vision   Signs and Symptoms (Fall Risk) presence of risk factors     Goal: Absence of Fall  Outcome: Ongoing (interventions implemented as appropriate)   06/03/19 1957   Fall Risk (Adult)   Absence of Fall making progress toward outcome       Problem: Skin Injury Risk (Adult)  Goal: Identify Related Risk Factors and Signs and Symptoms  Outcome: Ongoing (interventions implemented as appropriate)   06/03/19 1957   Skin Injury Risk (Adult)   Related Risk Factors (Skin Injury Risk) advanced age;fluid intake inadequate;mechanical forces;moisture     Goal: Skin Health and Integrity  Outcome: Ongoing (interventions implemented as appropriate)   06/03/19 1957   Skin  Injury Risk (Adult)   Skin Health and Integrity making progress toward outcome       Problem: Pain, Chronic (Adult)  Goal: Identify Related Risk Factors and Signs and Symptoms  Outcome: Ongoing (interventions implemented as appropriate)   06/03/19 1957   Pain, Chronic (Adult)   Related Risk Factors (Chronic Pain) disease process;knowledge deficit   Signs and Symptoms (Chronic Pain) fatigue/weakness;sleep pattern change;verbalization of pain/discomfort for a prolonged time period     Goal: Acceptable Pain/Comfort Level and Functional Ability  Outcome: Ongoing (interventions implemented as appropriate)   06/03/19 1957   Pain, Chronic (Adult)   Acceptable Pain/Comfort Level and Functional Ability making progress toward outcome

## 2019-06-05 PROBLEM — R11.2 NON-INTRACTABLE VOMITING WITH NAUSEA: Status: ACTIVE | Noted: 2019-06-02

## 2019-06-05 LAB
ANION GAP SERPL CALCULATED.3IONS-SCNC: 7.4 MMOL/L
BASOPHILS # BLD AUTO: 0.06 10*3/MM3 (ref 0–0.2)
BASOPHILS NFR BLD AUTO: 1.1 % (ref 0–1.5)
BUN BLD-MCNC: 9 MG/DL (ref 8–23)
BUN/CREAT SERPL: 11.1 (ref 7–25)
CALCIUM SPEC-SCNC: 8.6 MG/DL (ref 8.6–10.5)
CHLORIDE SERPL-SCNC: 102 MMOL/L (ref 98–107)
CO2 SERPL-SCNC: 23.6 MMOL/L (ref 22–29)
CREAT BLD-MCNC: 0.81 MG/DL (ref 0.57–1)
DEPRECATED RDW RBC AUTO: 42.8 FL (ref 37–54)
EOSINOPHIL # BLD AUTO: 0.24 10*3/MM3 (ref 0–0.4)
EOSINOPHIL NFR BLD AUTO: 4.5 % (ref 0.3–6.2)
ERYTHROCYTE [DISTWIDTH] IN BLOOD BY AUTOMATED COUNT: 12.6 % (ref 12.3–15.4)
GFR SERPL CREATININE-BSD FRML MDRD: 70 ML/MIN/1.73
GLUCOSE BLD-MCNC: 85 MG/DL (ref 65–99)
HCT VFR BLD AUTO: 30.3 % (ref 34–46.6)
HGB BLD-MCNC: 9.8 G/DL (ref 12–15.9)
IMM GRANULOCYTES # BLD AUTO: 0.02 10*3/MM3 (ref 0–0.05)
IMM GRANULOCYTES NFR BLD AUTO: 0.4 % (ref 0–0.5)
LYMPHOCYTES # BLD AUTO: 1.34 10*3/MM3 (ref 0.7–3.1)
LYMPHOCYTES NFR BLD AUTO: 25.3 % (ref 19.6–45.3)
MCH RBC QN AUTO: 30 PG (ref 26.6–33)
MCHC RBC AUTO-ENTMCNC: 32.3 G/DL (ref 31.5–35.7)
MCV RBC AUTO: 92.7 FL (ref 79–97)
MONOCYTES # BLD AUTO: 0.75 10*3/MM3 (ref 0.1–0.9)
MONOCYTES NFR BLD AUTO: 14.2 % (ref 5–12)
NEUTROPHILS # BLD AUTO: 2.89 10*3/MM3 (ref 1.7–7)
NEUTROPHILS NFR BLD AUTO: 54.5 % (ref 42.7–76)
NRBC BLD AUTO-RTO: 0 /100 WBC (ref 0–0.2)
PLAT MORPH BLD: NORMAL
PLATELET # BLD AUTO: 213 10*3/MM3 (ref 140–450)
PMV BLD AUTO: 9.7 FL (ref 6–12)
POTASSIUM BLD-SCNC: 4.4 MMOL/L (ref 3.5–5.2)
RBC # BLD AUTO: 3.27 10*6/MM3 (ref 3.77–5.28)
RBC MORPH BLD: NORMAL
SODIUM BLD-SCNC: 133 MMOL/L (ref 136–145)
WBC MORPH BLD: NORMAL
WBC NRBC COR # BLD: 5.3 10*3/MM3 (ref 3.4–10.8)

## 2019-06-05 PROCEDURE — 80048 BASIC METABOLIC PNL TOTAL CA: CPT | Performed by: HOSPITALIST

## 2019-06-05 PROCEDURE — 94799 UNLISTED PULMONARY SVC/PX: CPT

## 2019-06-05 PROCEDURE — 25010000002 ONDANSETRON PER 1 MG: Performed by: HOSPITALIST

## 2019-06-05 PROCEDURE — 25010000002 MORPHINE PER 10 MG: Performed by: NURSE PRACTITIONER

## 2019-06-05 PROCEDURE — 25810000003 SODIUM CHLORIDE 0.9 % WITH KCL 20 MEQ 20-0.9 MEQ/L-% SOLUTION: Performed by: HOSPITALIST

## 2019-06-05 PROCEDURE — 85025 COMPLETE CBC W/AUTO DIFF WBC: CPT | Performed by: HOSPITALIST

## 2019-06-05 PROCEDURE — 85007 BL SMEAR W/DIFF WBC COUNT: CPT | Performed by: HOSPITALIST

## 2019-06-05 PROCEDURE — 99232 SBSQ HOSP IP/OBS MODERATE 35: CPT | Performed by: INTERNAL MEDICINE

## 2019-06-05 RX ORDER — POLYETHYLENE GLYCOL 3350 17 G/17G
17 POWDER, FOR SOLUTION ORAL 2 TIMES DAILY
Status: DISCONTINUED | OUTPATIENT
Start: 2019-06-05 | End: 2019-06-06 | Stop reason: HOSPADM

## 2019-06-05 RX ADMIN — IPRATROPIUM BROMIDE AND ALBUTEROL SULFATE 3 ML: 2.5; .5 SOLUTION RESPIRATORY (INHALATION) at 07:24

## 2019-06-05 RX ADMIN — PROPRANOLOL HYDROCHLORIDE 80 MG: 40 TABLET ORAL at 18:18

## 2019-06-05 RX ADMIN — ONDANSETRON HYDROCHLORIDE 4 MG: 2 SOLUTION INTRAMUSCULAR; INTRAVENOUS at 06:59

## 2019-06-05 RX ADMIN — PHENYTOIN SODIUM 300 MG: 100 CAPSULE, EXTENDED RELEASE ORAL at 21:28

## 2019-06-05 RX ADMIN — MORPHINE SULFATE 2 MG: 2 INJECTION, SOLUTION INTRAMUSCULAR; INTRAVENOUS at 17:06

## 2019-06-05 RX ADMIN — PANTOPRAZOLE SODIUM 40 MG: 40 TABLET, DELAYED RELEASE ORAL at 06:58

## 2019-06-05 RX ADMIN — POLYETHYLENE GLYCOL 3350 17 G: 17 POWDER, FOR SOLUTION ORAL at 18:18

## 2019-06-05 RX ADMIN — DOCUSATE SODIUM 200 MG: 100 CAPSULE, LIQUID FILLED ORAL at 08:38

## 2019-06-05 RX ADMIN — MORPHINE SULFATE 2 MG: 2 INJECTION, SOLUTION INTRAMUSCULAR; INTRAVENOUS at 21:28

## 2019-06-05 RX ADMIN — POTASSIUM CHLORIDE 10 MEQ: 750 CAPSULE, EXTENDED RELEASE ORAL at 18:18

## 2019-06-05 RX ADMIN — MONTELUKAST SODIUM 10 MG: 10 TABLET, FILM COATED ORAL at 21:28

## 2019-06-05 RX ADMIN — POTASSIUM CHLORIDE 10 MEQ: 750 CAPSULE, EXTENDED RELEASE ORAL at 08:37

## 2019-06-05 RX ADMIN — IPRATROPIUM BROMIDE AND ALBUTEROL SULFATE 3 ML: 2.5; .5 SOLUTION RESPIRATORY (INHALATION) at 19:30

## 2019-06-05 RX ADMIN — IPRATROPIUM BROMIDE AND ALBUTEROL SULFATE 3 ML: 2.5; .5 SOLUTION RESPIRATORY (INHALATION) at 11:31

## 2019-06-05 RX ADMIN — MORPHINE SULFATE 2 MG: 2 INJECTION, SOLUTION INTRAMUSCULAR; INTRAVENOUS at 04:13

## 2019-06-05 RX ADMIN — IPRATROPIUM BROMIDE AND ALBUTEROL SULFATE 3 ML: 2.5; .5 SOLUTION RESPIRATORY (INHALATION) at 15:23

## 2019-06-05 RX ADMIN — PANTOPRAZOLE SODIUM 40 MG: 40 TABLET, DELAYED RELEASE ORAL at 18:18

## 2019-06-05 RX ADMIN — MORPHINE SULFATE 2 MG: 2 INJECTION, SOLUTION INTRAMUSCULAR; INTRAVENOUS at 12:22

## 2019-06-05 RX ADMIN — FLUTICASONE PROPIONATE 1 SPRAY: 50 SPRAY, METERED NASAL at 08:42

## 2019-06-05 RX ADMIN — Medication 5 DROP: at 21:28

## 2019-06-05 RX ADMIN — FLUTICASONE PROPIONATE 1 SPRAY: 50 SPRAY, METERED NASAL at 21:28

## 2019-06-05 RX ADMIN — LEVOTHYROXINE SODIUM 100 MCG: 100 TABLET ORAL at 06:58

## 2019-06-05 RX ADMIN — GUAIFENESIN 600 MG: 600 TABLET, EXTENDED RELEASE ORAL at 08:38

## 2019-06-05 RX ADMIN — DOCUSATE SODIUM 200 MG: 100 CAPSULE, LIQUID FILLED ORAL at 21:28

## 2019-06-05 RX ADMIN — MORPHINE SULFATE 2 MG: 2 INJECTION, SOLUTION INTRAMUSCULAR; INTRAVENOUS at 08:41

## 2019-06-05 RX ADMIN — Medication 2 MG: at 22:50

## 2019-06-05 RX ADMIN — POTASSIUM CHLORIDE AND SODIUM CHLORIDE 50 ML/HR: 900; 150 INJECTION, SOLUTION INTRAVENOUS at 06:58

## 2019-06-05 RX ADMIN — ONDANSETRON HYDROCHLORIDE 4 MG: 2 SOLUTION INTRAMUSCULAR; INTRAVENOUS at 20:31

## 2019-06-05 RX ADMIN — ONDANSETRON HYDROCHLORIDE 4 MG: 2 SOLUTION INTRAMUSCULAR; INTRAVENOUS at 12:22

## 2019-06-05 RX ADMIN — ASPIRIN 81 MG: 81 TABLET, CHEWABLE ORAL at 08:36

## 2019-06-05 RX ADMIN — PROPRANOLOL HYDROCHLORIDE 80 MG: 40 TABLET ORAL at 22:50

## 2019-06-05 RX ADMIN — PROPRANOLOL HYDROCHLORIDE 80 MG: 40 TABLET ORAL at 08:40

## 2019-06-05 RX ADMIN — GUAIFENESIN 600 MG: 600 TABLET, EXTENDED RELEASE ORAL at 21:28

## 2019-06-05 NOTE — PROGRESS NOTES
Emilia Bryant   68 y.o.  female    LOS: 2 days   Patient Care Team:  System, Provider Not In as PCP - General  Pedro Encinas MD as PCP - Claims Attributed      Subjective     Interval History:     Patient Complaints:  States she is having egd in am  Complains of some discomfort mainly in upper abdominal area now.  No definite chest discomfort.  No shortness of air.  Review of Systems:   No subjective fever or chills at present.  Apparently had some chills at the time of admission.  No headache  No cough or hemoptysis  No hematochezia or melena  No hematuria dysuria  No significant rash  No localized numbness weakness or tingling of the extremities    Medication Review:   Current Facility-Administered Medications:   •  acetaminophen (TYLENOL) suppository 650 mg, 650 mg, Rectal, Q4H PRN, Lemuel Cain MD  •  aspirin chewable tablet 81 mg, 81 mg, Oral, Daily, Brian Rojas MD, 81 mg at 06/05/19 0836  •  baclofen (LIORESAL) tablet 10 mg, 10 mg, Oral, BID PRN, Brian Rojas MD  •  docusate sodium (COLACE) capsule 200 mg, 200 mg, Oral, BID, Dorota Christianson APRN, 200 mg at 06/05/19 0838  •  fluticasone (FLONASE) 50 MCG/ACT nasal spray 1 spray, 1 spray, Each Nare, BID, Dorota Christianson APRN, 1 spray at 06/05/19 0842  •  Glycerin-Hypromellose- (ARTIFICIAL TEARS) 0.2-0.2-1 % ophthalmic solution solution 1 drop, 1 drop, Both Eyes, Q3H PRN, Dorota Christianson APRN  •  guaiFENesin (MUCINEX) 12 hr tablet 600 mg, 600 mg, Oral, Q12H, Brian Rojas MD, 600 mg at 06/05/19 0838  •  ipratropium-albuterol (DUO-NEB) nebulizer solution 3 mL, 3 mL, Nebulization, Q4H - RT, Brian Rojas MD, 3 mL at 06/05/19 0724  •  levothyroxine (SYNTHROID, LEVOTHROID) tablet 100 mcg, 100 mcg, Oral, QAM, Dorota Christianson APRN, 100 mcg at 06/05/19 0664  •  melatonin tablet 2 mg, 2 mg, Oral, Nightly PRN, Shante Mcmillan MD, 2 mg at 06/04/19 3902  •  montelukast (SINGULAIR) tablet 10 mg, 10 mg, Oral, Nightly, MeghanDorota duncan APRN, 10 mg at 06/04/19  2159  •  morphine injection 2 mg, 2 mg, Intravenous, Q4H PRN, Dorota Christianson APRN, 2 mg at 06/05/19 0841  •  [START ON 6/6/2019] morphine PF (DURAMORPH) 200 mg, bupivacaine (MARCAINE) 0.5 % 20 mL compound, , Intrathecal, Once, Reasor, Tyrell Burdick MD  •  ondansetron (ZOFRAN) injection 4 mg, 4 mg, Intravenous, Q6H PRN, Brian Rojas MD, 4 mg at 06/05/19 0659  •  pantoprazole (PROTONIX) EC tablet 40 mg, 40 mg, Oral, BID AC, Brian Rojas MD, 40 mg at 06/05/19 0658  •  phenytoin (DILANTIN) ER capsule 300 mg, 300 mg, Oral, Daily, Dorota Christianson APRN, 300 mg at 06/04/19 0826  •  potassium chloride (MICRO-K) CR capsule 10 mEq, 10 mEq, Oral, BID With Meals, Dorota Christianson APRN, 10 mEq at 06/05/19 0837  •  propranolol (INDERAL) tablet 80 mg, 80 mg, Oral, TID, Dorota Christianson APRN, 80 mg at 06/05/19 0840  •  sodium chloride 0.9 % flush 10 mL, 10 mL, Intravenous, PRN, Elder Dominguez MD  •  sodium chloride 0.9 % with KCl 20 mEq/L infusion, 50 mL/hr, Intravenous, Continuous, Brian Rojas MD, Last Rate: 50 mL/hr at 06/05/19 0658, 50 mL/hr at 06/05/19 0658  •  sucralfate (CARAFATE) 1 GM/10ML suspension 1 g, 1 g, Oral, 4x Daily AC & at Bedtime, Brian Rojas MD, 1 g at 06/04/19 2159      Objective   Vital Sign Min/Max for last 24 hours  Temp  Min: 98.3 °F (36.8 °C)  Max: 98.9 °F (37.2 °C)   BP  Min: 138/63  Max: 171/80    Pulse  Min: 56  Max: 67     Wt Readings from Last 3 Encounters:   06/05/19 74.1 kg (163 lb 6.4 oz)   04/24/19 72.1 kg (159 lb)   04/12/19 77.5 kg (170 lb 12.8 oz)        Intake/Output Summary (Last 24 hours) at 6/5/2019 1110  Last data filed at 6/5/2019 0900  Gross per 24 hour   Intake 1450 ml   Output 600 ml   Net 850 ml     Physical Exam:      General Appearance:    Well developed and well nourished wf in no acute distress   Head:    Normocephalic, atraumatic   Eyes:            Conjunctivae normal, non icteric, no xanthelasma   Neck:   supple, trachea midline, no thyromegaly, no carotid bruit, no  jvd, no elevated cvp   Lungs:     Clear to auscultation,respirations regular, even and                  unlabored    Heart:    Regular rhythm and normal rate, normal S1 and S2,            No murmur, no gallop, no rub, no click   Chest Wall:    No abnormalities observed   Abdomen:     Normal bowel sounds, no masses, no organomegaly, soft        mild epigastric tenderness, non-distended, no guarding, no rebound                tenderness   Rectal:     Deferred   Extremities:   No edema. Moves all extremities well, no cyanosis, no erythema   Pulses:   Pulses palpable and equal bilaterally   Skin:   No bleeding, bruising or rash   Neurologic:   awake alert and oriented x3, speech clear and approp, no facial drooping     :  voids   Monitor:   nsr    Results Review:         Sodium Sodium   Date Value Ref Range Status   06/05/2019 133 (L) 136 - 145 mmol/L Final   06/04/2019 133 (L) 136 - 145 mmol/L Final   06/03/2019 133 (L) 136 - 145 mmol/L Final   06/02/2019 132 (L) 136 - 145 mmol/L Final      Potassium Potassium   Date Value Ref Range Status   06/05/2019 4.4 3.5 - 5.2 mmol/L Final   06/04/2019 4.3 3.5 - 5.2 mmol/L Final   06/03/2019 4.0 3.5 - 5.2 mmol/L Final   06/02/2019 3.9 3.5 - 5.2 mmol/L Final      Chloride Chloride   Date Value Ref Range Status   06/05/2019 102 98 - 107 mmol/L Final   06/04/2019 99 98 - 107 mmol/L Final   06/03/2019 95 (L) 98 - 107 mmol/L Final   06/02/2019 95 (L) 98 - 107 mmol/L Final      Bicarbonate No results found for: PLASMABICARB   BUN BUN   Date Value Ref Range Status   06/05/2019 9 8 - 23 mg/dL Final   06/04/2019 10 8 - 23 mg/dL Final   06/03/2019 15 8 - 23 mg/dL Final   06/02/2019 18 8 - 23 mg/dL Final      Creatinine Creatinine   Date Value Ref Range Status   06/05/2019 0.81 0.57 - 1.00 mg/dL Final   06/04/2019 0.88 0.57 - 1.00 mg/dL Final   06/03/2019 1.05 (H) 0.57 - 1.00 mg/dL Final   06/02/2019 1.11 (H) 0.57 - 1.00 mg/dL Final      Calcium Calcium   Date Value Ref Range Status    06/05/2019 8.6 8.6 - 10.5 mg/dL Final   06/04/2019 8.8 8.6 - 10.5 mg/dL Final   06/03/2019 8.8 8.6 - 10.5 mg/dL Final   06/02/2019 8.1 (L) 8.6 - 10.5 mg/dL Final      Magnesium No results found for: MG     Results from last 7 days   Lab Units 06/05/19  0505   WBC 10*3/mm3 5.30   HEMOGLOBIN g/dL 9.8*   HEMATOCRIT % 30.3*   PLATELETS 10*3/mm3 213     Lab Results   Lab Value Date/Time    TROPONINT <0.010 06/03/2019 0147    TROPONINT <0.010 06/02/2019 2222    TROPONINT <0.010 06/02/2019 2218    TROPONINT <0.010 04/24/2019 2033    TROPONINT <0.010 04/24/2019 1832    TROPONINT <0.010 04/12/2019 1915    TROPONINT <0.010 04/12/2019 1624    TROPONINT <0.010 03/29/2019 0555    TROPONINT <0.010 03/28/2019 0432     Lab Results   Component Value Date    CHOL 199 06/04/2019     Lab Results   Component Value Date    HDL 78 (H) 06/04/2019     Lab Results   Component Value Date     (H) 06/04/2019     Lab Results   Component Value Date    TRIG 93 06/04/2019     No components found for: CHOLHDL  No results found for: PTT  No components found for: PT/INR  Lab Results   Component Value Date    HGBA1C 5.30 06/04/2019      Lab Results   Component Value Date    TSH 1.630 06/04/2019        Echo EF Estimated  No results found for: ECHOEFEST      Assessment/ Plan    Active Hospital Problems    Diagnosis POA   • Precordial pain [R07.2] Yes     Priority: Low     Non cardiac cp  Trop neg x3  Cxr/ct chest neg  Nausea/emesis/abd distress- gi foll  HH  Diverticulosis  htn  Dyslipidemia  copd  H/o cva  Plan   egd in am  Home after egd if appropriate    FELIPE Corrales  06/05/19  11:10 AM    Patient seen and examined.  Agree with the note above by FELIPE Andrea and I made some additions to that note.  No chest pain at this time.  She is complaining of some discomfort in her epigastric area and is a little tender there on exam.  No shortness of air no palpitations or lightheadedness  Cardiac exam unremarkable  Plans noted for EGD  in the morning.  I have called the office to get the records from our office.  She sees Dr. Orellana of there.  For now continue current cardiac medicines.  Probably discharge tomorrow after EGD pending gastroenterology opinion.    Thomas Avila MD  Time 18 minutes

## 2019-06-05 NOTE — PROGRESS NOTES
[facility]     Inpatient Follow Up Note    Patient Identification:  Name: Emilia Bryant  Age: 68 y.o.  Sex: female  :  1950  MRN: [id number]    Information from:patient     CC: Chest/epigastric pain.     History:   Complaining of bilateral ear pain which she says is fairly severe.  Apparently started earlier today.  The pain is severe enough that she is rocking back and forth in bed.  She is not having much in the way of epigastric distress or chest pain at this time.    Review of Systems:  Constitutional:  Negative   Cardiovascular:  Negative   Respiratory:  Negative             Problem List:  Patient Active Problem List    Diagnosis   • Precordial pain [R07.2]   • Abdominal pain, epigastric [R10.13]   • Nausea & vomiting [R11.2]   • Dizzy [R42]   • Visual changes [H53.9]   • Generalized weakness [R53.1]   • Acute nonintractable headache [R51]   • Arthritis [M19.90]   • Brain aneurysm [I67.1]   • COPD (chronic obstructive pulmonary disease) (CMS/HCC) [J44.9]   • Disease of thyroid gland [E07.9]   • Legally blind [H54.8]   • GERD (gastroesophageal reflux disease) [K21.9]   • Migraine [G43.909]   • Seizures (CMS/[facility]) [R56.9]     Current Meds:  MAR Reviewed  Scheduled Meds:  aspirin 81 mg Oral Daily   docusate sodium 200 mg Oral BID   fluticasone 1 spray Each Nare BID   guaiFENesin 600 mg Oral Q12H   ipratropium-albuterol 3 mL Nebulization Q4H - RT   levothyroxine 100 mcg Oral QAM   montelukast 10 mg Oral Nightly   [START ON 2019] custom medication builder  Intrathecal Once   pantoprazole 40 mg Oral BID AC   phenytoin 300 mg Oral Daily   potassium chloride 10 mEq Oral BID With Meals   propranolol 80 mg Oral TID   sucralfate 1 g Oral 4x Daily AC & at Bedtime     Continuous Infusions:  sodium chloride 0.9 % with KCl 20 mEq 50 mL/hr Last Rate: 50 mL/hr (19 0658)     PRN Meds:.•  acetaminophen  •  baclofen  •  Glycerin-Hypromellose-  •  melatonin  •  Morphine  •   "ondansetron  •  sodium chloride  Allergies:  Allergies   Allergen Reactions   • Adhesive Tape    • Ambien [Zolpidem] Mental Status Change   • Strawberry Extract Hives   • Topiramate Other (See Comments)     unknown       Intake/Output:     Intake/Output Summary (Last 24 hours) at 2019 1647  Last data filed at 2019 0900  Gross per 24 hour   Intake 1450 ml   Output 600 ml   Net 850 ml     New allergies/reactions:  None    Physical Exam:  Vitals:   Temp (24hrs), Av.4 °F (36.9 °C), Min:98.3 °F (36.8 °C), Max:98.7 °F (37.1 °C)    Temp:  [98.3 °F (36.8 °C)-98.7 °F (37.1 °C)] 98.3 °F (36.8 °C)  Heart Rate:  [56-67] 62  Resp:  [16-18] 18  BP: (138-170)/(61-78) 143/66  /66 (BP Location: Right arm, Patient Position: Lying)   Pulse 62   Temp 98.3 °F (36.8 °C) (Oral)   Resp 18   Ht 167.6 cm (66\")   Wt 74.1 kg (163 lb 6.4 oz)   SpO2 97%   BMI 26.37 kg/m²     Exam:  NAD  PERRLA. Sclerae and conjunctivae normal  HENT: external inspection normal. Hearing intact.  No respiratory distress.  Alert, oriented, normal affect.         DATA:  Radiology and Labs:   Recent Results (from the past 24 hour(s))   Basic Metabolic Panel    Collection Time: 19  5:05 AM   Result Value Ref Range    Glucose 85 65 - 99 mg/dL    BUN 9 8 - 23 mg/dL    Creatinine 0.81 0.57 - 1.00 mg/dL    Sodium 133 (L) 136 - 145 mmol/L    Potassium 4.4 3.5 - 5.2 mmol/L    Chloride 102 98 - 107 mmol/L    CO2 23.6 22.0 - 29.0 mmol/L    Calcium 8.6 8.6 - 10.5 mg/dL    eGFR Non African Amer 70 >60 mL/min/1.73    BUN/Creatinine Ratio 11.1 7.0 - 25.0    Anion Gap 7.4 mmol/L   CBC Auto Differential    Collection Time: 19  5:05 AM   Result Value Ref Range    WBC 5.30 3.40 - 10.80 10*3/mm3    RBC 3.27 (L) 3.77 - 5.28 10*6/mm3    Hemoglobin 9.8 (L) 12.0 - 15.9 g/dL    Hematocrit 30.3 (L) 34.0 - 46.6 %    MCV 92.7 79.0 - 97.0 fL    MCH 30.0 26.6 - 33.0 pg    MCHC 32.3 31.5 - 35.7 g/dL    RDW 12.6 12.3 - 15.4 %    RDW-SD 42.8 37.0 - 54.0 fl    " MPV 9.7 6.0 - 12.0 fL    Platelets 213 140 - 450 10*3/mm3    Neutrophil % 54.5 42.7 - 76.0 %    Lymphocyte % 25.3 19.6 - 45.3 %    Monocyte % 14.2 (H) 5.0 - 12.0 %    Eosinophil % 4.5 0.3 - 6.2 %    Basophil % 1.1 0.0 - 1.5 %    Immature Grans % 0.4 0.0 - 0.5 %    Neutrophils, Absolute 2.89 1.70 - 7.00 10*3/mm3    Lymphocytes, Absolute 1.34 0.70 - 3.10 10*3/mm3    Monocytes, Absolute 0.75 0.10 - 0.90 10*3/mm3    Eosinophils, Absolute 0.24 0.00 - 0.40 10*3/mm3    Basophils, Absolute 0.06 0.00 - 0.20 10*3/mm3    Immature Grans, Absolute 0.02 0.00 - 0.05 10*3/mm3    nRBC 0.0 0.0 - 0.2 /100 WBC   Scan Slide    Collection Time: 06/05/19  5:05 AM   Result Value Ref Range    RBC Morphology Normal Normal    WBC Morphology Normal Normal    Platelet Morphology Normal Normal       Assessment:   Problem List:     Precordial pain    She has bilateral ear pain.  She has epigastric/chest pain which will be further evaluated.    Plan:   EGD in a.m.  If not already ordered, I see that she gets 1 dose of opioid to try to give her some relief.      Jovanni Chavez MD  [facility]  6/5/2019

## 2019-06-05 NOTE — PROGRESS NOTES
"Daily progress note    Chief complaint  Doing same  Complain of abdominal pain  No chest pain shortness of breath or palpitation    History of present illness  68-year-old white female with history of COPD congestive heart failure hypothyroidism seizure disorder and gastroesophageal reflux presented to [facility], operated by [facility] emergency room with chest pain that started yesterday evening.  Patient described pain as a constant which improved with nitro.  Patient denies any associated symptoms including shortness of breath nausea vomiting or diaphoresis.  Patient does have some chills but no fever also complained of headache and described pain as a tightness and constant no relationship with food exertion or movement.  Patient evaluated in ER and initial work-up negative admit to cardiology service and I am asked to follow the patient for medical problem.  At the time of interview patient still hurting but no respiratory distress.     REVIEW OF SYSTEMS  Remarkable for abdominal pain      PHYSICAL EXAM  Blood pressure 143/66, pulse 62, temperature 98.3 °F (36.8 °C), temperature source Oral, resp. rate 18, height 167.6 cm (66\"), weight 74.1 kg (163 lb 6.4 oz), SpO2 97 %.    Constitutional: She is oriented to person, place, and time. No distress.   Head: Normocephalic and atraumatic.   Eyes: EOM are normal. Pupils are equal, round, and reactive to light.   Neck: Normal range of motion. Neck supple.   Cardiovascular: Normal rate and regular rhythm. Murmur heard.  Pulmonary/Chest: Effort normal. No respiratory distress. She has rhonchi (mild).   Abdominal: Soft. There is no tenderness. There is no rebound and no guarding.   Musculoskeletal: Normal range of motion. She exhibits no edema.   Neurological: She is alert and oriented to person, place, and time. She has normal sensation and normal strength.   Skin: Skin is warm and dry. No rash noted.   Psychiatric: Mood and affect normal.     LAB RESULTS  Lab Results (last 24 hours)     " Procedure Component Value Units Date/Time    CBC & Differential [[id number]] Collected:  06/05/19 0505    Specimen:  Blood Updated:  06/05/19 0651    Narrative:       The following orders were created for panel order CBC & Differential.  Procedure                               Abnormality         Status                     ---------                               -----------         ------                     Scan Slide[[id number]]                   Normal              Final result               CBC Auto Differential[[id number]]        Abnormal            Final result                 Please view results for these tests on the individual orders.    CBC Auto Differential [[id number]]  (Abnormal) Collected:  06/05/19 0505    Specimen:  Blood Updated:  06/05/19 0651     WBC 5.30 10*3/mm3      RBC 3.27 10*6/mm3      Hemoglobin 9.8 g/dL      Hematocrit 30.3 %      MCV 92.7 fL      MCH 30.0 pg      MCHC 32.3 g/dL      RDW 12.6 %      RDW-SD 42.8 fl      MPV 9.7 fL      Platelets 213 10*3/mm3      Neutrophil % 54.5 %      Lymphocyte % 25.3 %      Monocyte % 14.2 %      Eosinophil % 4.5 %      Basophil % 1.1 %      Immature Grans % 0.4 %      Neutrophils, Absolute 2.89 10*3/mm3      Lymphocytes, Absolute 1.34 10*3/mm3      Monocytes, Absolute 0.75 10*3/mm3      Eosinophils, Absolute 0.24 10*3/mm3      Basophils, Absolute 0.06 10*3/mm3      Immature Grans, Absolute 0.02 10*3/mm3      nRBC 0.0 /100 WBC     Scan Slide [[id number]]  (Normal) Collected:  06/05/19 0505    Specimen:  Blood Updated:  06/05/19 0651     RBC Morphology Normal     WBC Morphology Normal     Platelet Morphology Normal    Basic Metabolic Panel [[id number]]  (Abnormal) Collected:  06/05/19 0505    Specimen:  Blood Updated:  06/05/19 0613     Glucose 85 mg/dL      BUN 9 mg/dL      Creatinine 0.81 mg/dL      Sodium 133 mmol/L      Potassium 4.4 mmol/L      Chloride 102 mmol/L      CO2 23.6 mmol/L      Calcium 8.6 mg/dL      eGFR Non African Amer 70 mL/min/1.73       BUN/Creatinine Ratio 11.1     Anion Gap 7.4 mmol/L     Narrative:       GFR Normal >60  Chronic Kidney Disease <60  Kidney Failure <15        Imaging Results (last 24 hours)     ** No results found for the last 24 hours. **        EKG                              Rhythm/Rate: NSR, 68  P waves and [place]: Normal  QRS, axis: LAD, no QRS conduction delay  ST and T waves: No acute ST changes       Current Facility-Administered Medications:   •  acetaminophen (TYLENOL) suppository 650 mg, 650 mg, Rectal, Q4H PRN, Lemuel Cain MD  •  aspirin chewable tablet 81 mg, 81 mg, Oral, Daily, Brian Rojas MD, 81 mg at 06/05/19 0836  •  baclofen (LIORESAL) tablet 10 mg, 10 mg, Oral, BID PRN, Brian Rojas MD  •  docusate sodium (COLACE) capsule 200 mg, 200 mg, Oral, BID, Dorota Christianson APRN, 200 mg at 06/05/19 0838  •  fluticasone (FLONASE) 50 MCG/ACT nasal spray 1 spray, 1 spray, Each Nare, BID, Dorota Christianson APRN, 1 spray at 06/05/19 0842  •  Glycerin-Hypromellose- (ARTIFICIAL TEARS) 0.2-0.2-1 % ophthalmic solution solution 1 drop, 1 drop, Both Eyes, Q3H PRN, Dorota Christianson APRN  •  guaiFENesin (MUCINEX) 12 hr tablet 600 mg, 600 mg, Oral, Q12H, Brian Rojas MD, 600 mg at 06/05/19 0838  •  ipratropium-albuterol (DUO-NEB) nebulizer solution 3 mL, 3 mL, Nebulization, Q4H - RT, Brian Rojas MD, 3 mL at 06/05/19 1523  •  levothyroxine (SYNTHROID, LEVOTHROID) tablet 100 mcg, 100 mcg, Oral, QAM, Dorota Christianson APRN, 100 mcg at 06/05/19 0658  •  melatonin tablet 2 mg, 2 mg, Oral, Nightly PRN, Shante Mcmillan MD, 2 mg at 06/04/19 2213  •  montelukast (SINGULAIR) tablet 10 mg, 10 mg, Oral, Nightly, Dorota Christianson, APRN, 10 mg at 06/04/19 2159  •  morphine injection 2 mg, 2 mg, Intravenous, Q4H PRN, Dorota Christianson, FELIPE, 2 mg at 06/05/19 1222  •  [START ON 6/6/2019] morphine PF (DURAMORPH) 200 mg, bupivacaine (MARCAINE) 0.5 % 20 mL compound, , Intrathecal, Once, Reasor, Tyrell Burdick MD  •  ondansetron (ZOFRAN) injection 4  mg, 4 mg, Intravenous, Q6H PRN, Suzy Rojas MD, 4 mg at 06/05/19 1222  •  pantoprazole (PROTONIX) EC tablet 40 mg, 40 mg, Oral, BID AC, Suzy Rojas MD, 40 mg at 06/05/19 0658  •  phenytoin (DILANTIN) ER capsule 300 mg, 300 mg, Oral, Daily, Dorota Christianson, APRN, 300 mg at 06/04/19 0826  •  potassium chloride (MICRO-K) CR capsule 10 mEq, 10 mEq, Oral, BID With Meals, Dorota Christianson, APRN, 10 mEq at 06/05/19 0837  •  propranolol (INDERAL) tablet 80 mg, 80 mg, Oral, TID, Dorota Christianson APRN, 80 mg at 06/05/19 0840  •  sodium chloride 0.9 % flush 10 mL, 10 mL, Intravenous, PRN, Elder Dominguez MD  •  sodium chloride 0.9 % with KCl 20 mEq/L infusion, 50 mL/hr, Intravenous, Continuous, Suzy Rojas MD, Last Rate: 50 mL/hr at 06/05/19 0658, 50 mL/hr at 06/05/19 0658  •  sucralfate (CARAFATE) 1 GM/10ML suspension 1 g, 1 g, Oral, 4x Daily AC & at Bedtime, Suzy Rojas MD, 1 g at 06/04/19 2158     ASSESSMENT  Chest pain resolved  Abdominal pain  Diverticulosis  Hypertension  Hyperlipidemia  Hypothyroidism  COPD  Seizure disorder  Chronic anemia  Gastroesophageal reflux disease    PLAN  CPM  IV fluid  Protonix and Carafate  Endoscopy in a.m.  Continue home medications  Stress ulcer DVT prophylaxis  Supportive care  PT/OT  We will follow with Dr.BESSEN SUZY ROJAS MD

## 2019-06-06 ENCOUNTER — ANESTHESIA EVENT (OUTPATIENT)
Dept: GASTROENTEROLOGY | Facility: HOSPITAL | Age: 69
End: 2019-06-06

## 2019-06-06 ENCOUNTER — ANESTHESIA (OUTPATIENT)
Dept: GASTROENTEROLOGY | Facility: HOSPITAL | Age: 69
End: 2019-06-06

## 2019-06-06 VITALS
HEIGHT: 66 IN | HEART RATE: 59 BPM | BODY MASS INDEX: 26.26 KG/M2 | OXYGEN SATURATION: 99 % | WEIGHT: 163.4 LBS | TEMPERATURE: 98.3 F | SYSTOLIC BLOOD PRESSURE: 162 MMHG | RESPIRATION RATE: 16 BRPM | DIASTOLIC BLOOD PRESSURE: 72 MMHG

## 2019-06-06 PROBLEM — R07.89 NON-CARDIAC CHEST PAIN: Status: ACTIVE | Noted: 2019-06-06

## 2019-06-06 LAB
ANION GAP SERPL CALCULATED.3IONS-SCNC: 9.3 MMOL/L
BASOPHILS # BLD AUTO: 0.05 10*3/MM3 (ref 0–0.2)
BASOPHILS NFR BLD AUTO: 1 % (ref 0–1.5)
BUN BLD-MCNC: 8 MG/DL (ref 8–23)
BUN/CREAT SERPL: 10.1 (ref 7–25)
CALCIUM SPEC-SCNC: 8.8 MG/DL (ref 8.6–10.5)
CHLORIDE SERPL-SCNC: 103 MMOL/L (ref 98–107)
CO2 SERPL-SCNC: 24.7 MMOL/L (ref 22–29)
CREAT BLD-MCNC: 0.79 MG/DL (ref 0.57–1)
DEPRECATED RDW RBC AUTO: 43.7 FL (ref 37–54)
EOSINOPHIL # BLD AUTO: 0.26 10*3/MM3 (ref 0–0.4)
EOSINOPHIL NFR BLD AUTO: 5.1 % (ref 0.3–6.2)
ERYTHROCYTE [DISTWIDTH] IN BLOOD BY AUTOMATED COUNT: 12.6 % (ref 12.3–15.4)
GFR SERPL CREATININE-BSD FRML MDRD: 72 ML/MIN/1.73
GLUCOSE BLD-MCNC: 90 MG/DL (ref 65–99)
HCT VFR BLD AUTO: 31.9 % (ref 34–46.6)
HGB BLD-MCNC: 10 G/DL (ref 12–15.9)
IMM GRANULOCYTES # BLD AUTO: 0.02 10*3/MM3 (ref 0–0.05)
IMM GRANULOCYTES NFR BLD AUTO: 0.4 % (ref 0–0.5)
LYMPHOCYTES # BLD AUTO: 1.43 10*3/MM3 (ref 0.7–3.1)
LYMPHOCYTES NFR BLD AUTO: 28.1 % (ref 19.6–45.3)
MCH RBC QN AUTO: 29.6 PG (ref 26.6–33)
MCHC RBC AUTO-ENTMCNC: 31.3 G/DL (ref 31.5–35.7)
MCV RBC AUTO: 94.4 FL (ref 79–97)
MONOCYTES # BLD AUTO: 0.68 10*3/MM3 (ref 0.1–0.9)
MONOCYTES NFR BLD AUTO: 13.4 % (ref 5–12)
NEUTROPHILS # BLD AUTO: 2.64 10*3/MM3 (ref 1.7–7)
NEUTROPHILS NFR BLD AUTO: 52 % (ref 42.7–76)
NRBC BLD AUTO-RTO: 0 /100 WBC (ref 0–0.2)
PLATELET # BLD AUTO: 213 10*3/MM3 (ref 140–450)
PMV BLD AUTO: 8.8 FL (ref 6–12)
POTASSIUM BLD-SCNC: 4.1 MMOL/L (ref 3.5–5.2)
RBC # BLD AUTO: 3.38 10*6/MM3 (ref 3.77–5.28)
SODIUM BLD-SCNC: 137 MMOL/L (ref 136–145)
WBC NRBC COR # BLD: 5.08 10*3/MM3 (ref 3.4–10.8)

## 2019-06-06 PROCEDURE — 80048 BASIC METABOLIC PNL TOTAL CA: CPT | Performed by: HOSPITALIST

## 2019-06-06 PROCEDURE — 43239 EGD BIOPSY SINGLE/MULTIPLE: CPT | Performed by: INTERNAL MEDICINE

## 2019-06-06 PROCEDURE — 97162 PT EVAL MOD COMPLEX 30 MIN: CPT

## 2019-06-06 PROCEDURE — 94799 UNLISTED PULMONARY SVC/PX: CPT

## 2019-06-06 PROCEDURE — 88305 TISSUE EXAM BY PATHOLOGIST: CPT | Performed by: INTERNAL MEDICINE

## 2019-06-06 PROCEDURE — 25810000003 SODIUM CHLORIDE 0.9 % WITH KCL 20 MEQ 20-0.9 MEQ/L-% SOLUTION: Performed by: HOSPITALIST

## 2019-06-06 PROCEDURE — 25010000002 PROPOFOL 10 MG/ML EMULSION: Performed by: ANESTHESIOLOGY

## 2019-06-06 PROCEDURE — 85025 COMPLETE CBC W/AUTO DIFF WBC: CPT | Performed by: HOSPITALIST

## 2019-06-06 PROCEDURE — 25010000002 ONDANSETRON PER 1 MG: Performed by: HOSPITALIST

## 2019-06-06 PROCEDURE — 25010000002 MORPHINE PER 10 MG: Performed by: NURSE PRACTITIONER

## 2019-06-06 PROCEDURE — 0DD38ZX EXTRACTION OF LOWER ESOPHAGUS, VIA NATURAL OR ARTIFICIAL OPENING ENDOSCOPIC, DIAGNOSTIC: ICD-10-PCS | Performed by: INTERNAL MEDICINE

## 2019-06-06 PROCEDURE — 97110 THERAPEUTIC EXERCISES: CPT

## 2019-06-06 RX ORDER — CETIRIZINE HYDROCHLORIDE 10 MG/1
10 TABLET ORAL DAILY
Status: DISCONTINUED | OUTPATIENT
Start: 2019-06-06 | End: 2019-06-06 | Stop reason: HOSPADM

## 2019-06-06 RX ORDER — PROPOFOL 10 MG/ML
VIAL (ML) INTRAVENOUS CONTINUOUS PRN
Status: DISCONTINUED | OUTPATIENT
Start: 2019-06-06 | End: 2019-06-06 | Stop reason: SURG

## 2019-06-06 RX ORDER — CETIRIZINE HYDROCHLORIDE 10 MG/1
10 TABLET ORAL DAILY
Status: DISCONTINUED | OUTPATIENT
Start: 2019-06-06 | End: 2019-06-06

## 2019-06-06 RX ORDER — SODIUM CHLORIDE, SODIUM LACTATE, POTASSIUM CHLORIDE, CALCIUM CHLORIDE 600; 310; 30; 20 MG/100ML; MG/100ML; MG/100ML; MG/100ML
1000 INJECTION, SOLUTION INTRAVENOUS CONTINUOUS
Status: DISCONTINUED | OUTPATIENT
Start: 2019-06-06 | End: 2019-06-06

## 2019-06-06 RX ORDER — ACETAMINOPHEN 325 MG/1
650 TABLET ORAL ONCE
Status: COMPLETED | OUTPATIENT
Start: 2019-06-06 | End: 2019-06-06

## 2019-06-06 RX ORDER — GUAIFENESIN 600 MG/1
600 TABLET, EXTENDED RELEASE ORAL EVERY 12 HOURS SCHEDULED
Qty: 20 TABLET | Refills: 0 | Status: SHIPPED | OUTPATIENT
Start: 2019-06-06

## 2019-06-06 RX ORDER — ONDANSETRON HYDROCHLORIDE 8 MG/1
8 TABLET, FILM COATED ORAL ONCE
Status: DISCONTINUED | OUTPATIENT
Start: 2019-06-06 | End: 2019-06-06 | Stop reason: HOSPADM

## 2019-06-06 RX ORDER — PROPOFOL 10 MG/ML
VIAL (ML) INTRAVENOUS AS NEEDED
Status: DISCONTINUED | OUTPATIENT
Start: 2019-06-06 | End: 2019-06-06 | Stop reason: SURG

## 2019-06-06 RX ORDER — LIDOCAINE HYDROCHLORIDE 20 MG/ML
INJECTION, SOLUTION INFILTRATION; PERINEURAL AS NEEDED
Status: DISCONTINUED | OUTPATIENT
Start: 2019-06-06 | End: 2019-06-06 | Stop reason: SURG

## 2019-06-06 RX ORDER — ECHINACEA PURPUREA EXTRACT 125 MG
2 TABLET ORAL AS NEEDED
Status: DISCONTINUED | OUTPATIENT
Start: 2019-06-06 | End: 2019-06-06 | Stop reason: HOSPADM

## 2019-06-06 RX ORDER — PANTOPRAZOLE SODIUM 40 MG/1
40 TABLET, DELAYED RELEASE ORAL DAILY
Qty: 30 TABLET | Refills: 6 | Status: SHIPPED | OUTPATIENT
Start: 2019-06-06

## 2019-06-06 RX ADMIN — POTASSIUM CHLORIDE AND SODIUM CHLORIDE 50 ML/HR: 900; 150 INJECTION, SOLUTION INTRAVENOUS at 01:15

## 2019-06-06 RX ADMIN — SODIUM CHLORIDE, POTASSIUM CHLORIDE, SODIUM LACTATE AND CALCIUM CHLORIDE 1000 ML: 600; 310; 30; 20 INJECTION, SOLUTION INTRAVENOUS at 08:49

## 2019-06-06 RX ADMIN — ONDANSETRON HYDROCHLORIDE 4 MG: 2 SOLUTION INTRAMUSCULAR; INTRAVENOUS at 06:04

## 2019-06-06 RX ADMIN — PROPRANOLOL HYDROCHLORIDE 80 MG: 40 TABLET ORAL at 11:36

## 2019-06-06 RX ADMIN — CETIRIZINE HYDROCHLORIDE 10 MG: 10 TABLET, FILM COATED ORAL at 13:13

## 2019-06-06 RX ADMIN — MORPHINE SULFATE 2 MG: 2 INJECTION, SOLUTION INTRAMUSCULAR; INTRAVENOUS at 06:04

## 2019-06-06 RX ADMIN — POLYETHYLENE GLYCOL 3350 17 G: 17 POWDER, FOR SOLUTION ORAL at 11:35

## 2019-06-06 RX ADMIN — FLUTICASONE PROPIONATE 1 SPRAY: 50 SPRAY, METERED NASAL at 11:35

## 2019-06-06 RX ADMIN — POTASSIUM CHLORIDE 10 MEQ: 750 CAPSULE, EXTENDED RELEASE ORAL at 11:36

## 2019-06-06 RX ADMIN — LIDOCAINE HYDROCHLORIDE 50 MG: 20 INJECTION, SOLUTION INFILTRATION; PERINEURAL at 09:59

## 2019-06-06 RX ADMIN — POTASSIUM CHLORIDE 10 MEQ: 750 CAPSULE, EXTENDED RELEASE ORAL at 17:02

## 2019-06-06 RX ADMIN — IPRATROPIUM BROMIDE AND ALBUTEROL SULFATE 3 ML: 2.5; .5 SOLUTION RESPIRATORY (INHALATION) at 07:01

## 2019-06-06 RX ADMIN — PROPRANOLOL HYDROCHLORIDE 40 MG: 40 TABLET ORAL at 17:02

## 2019-06-06 RX ADMIN — Medication 5 DROP: at 11:35

## 2019-06-06 RX ADMIN — ASPIRIN 81 MG: 81 TABLET, CHEWABLE ORAL at 11:36

## 2019-06-06 RX ADMIN — GUAIFENESIN 600 MG: 600 TABLET, EXTENDED RELEASE ORAL at 11:36

## 2019-06-06 RX ADMIN — PROPOFOL 150 MG: 10 INJECTION, EMULSION INTRAVENOUS at 10:00

## 2019-06-06 RX ADMIN — LEVOTHYROXINE SODIUM 100 MCG: 100 TABLET ORAL at 11:36

## 2019-06-06 RX ADMIN — IPRATROPIUM BROMIDE AND ALBUTEROL SULFATE 3 ML: 2.5; .5 SOLUTION RESPIRATORY (INHALATION) at 11:06

## 2019-06-06 RX ADMIN — PANTOPRAZOLE SODIUM 40 MG: 40 TABLET, DELAYED RELEASE ORAL at 11:35

## 2019-06-06 RX ADMIN — DOCUSATE SODIUM 200 MG: 100 CAPSULE, LIQUID FILLED ORAL at 11:35

## 2019-06-06 RX ADMIN — PROPOFOL 100 MCG/KG/MIN: 10 INJECTION, EMULSION INTRAVENOUS at 10:02

## 2019-06-06 RX ADMIN — SODIUM CHLORIDE, PRESERVATIVE FREE 10 ML: 5 INJECTION INTRAVENOUS at 10:37

## 2019-06-06 RX ADMIN — ONDANSETRON HYDROCHLORIDE 4 MG: 2 SOLUTION INTRAMUSCULAR; INTRAVENOUS at 13:13

## 2019-06-06 RX ADMIN — ACETAMINOPHEN 650 MG: 325 TABLET, FILM COATED ORAL at 13:13

## 2019-06-06 RX ADMIN — PANTOPRAZOLE SODIUM 40 MG: 40 TABLET, DELAYED RELEASE ORAL at 17:02

## 2019-06-06 RX ADMIN — IPRATROPIUM BROMIDE AND ALBUTEROL SULFATE 3 ML: 2.5; .5 SOLUTION RESPIRATORY (INHALATION) at 14:53

## 2019-06-06 NOTE — PROGRESS NOTES
"Daily progress note    Chief complaint  Doing same  No new complaints  Status post upper endoscopy    History of present illness  68-year-old white female with history of COPD congestive heart failure hypothyroidism seizure disorder and gastroesophageal reflux presented to [facility] emergency room with chest pain that started yesterday evening.  Patient described pain as a constant which improved with nitro.  Patient denies any associated symptoms including shortness of breath nausea vomiting or diaphoresis.  Patient does have some chills but no fever also complained of headache and described pain as a tightness and constant no relationship with food exertion or movement.  Patient evaluated in ER and initial work-up negative admit to cardiology service and I am asked to follow the patient for medical problem.  At the time of interview patient still hurting but no respiratory distress.     REVIEW OF SYSTEMS  Remarkable for abdominal pain      PHYSICAL EXAM  Blood pressure 157/76, pulse 68, temperature 98.7 °F (37.1 °C), temperature source Oral, resp. rate 16, height 167.6 cm (66\"), weight 74.1 kg (163 lb 6.4 oz), SpO2 100 %.    Constitutional: She is oriented to person, place, and time. No distress.   Head: Normocephalic and atraumatic.   Eyes: EOM are normal. Pupils are equal, round, and reactive to light.   Neck: Normal range of motion. Neck supple.   Cardiovascular: Normal rate and regular rhythm. Murmur heard.  Pulmonary/Chest: Effort normal. No respiratory distress. She has rhonchi (mild).   Abdominal: Soft. There is no tenderness. There is no rebound and no guarding.   Musculoskeletal: Normal range of motion. She exhibits no edema.   Neurological: She is alert and oriented to person, place, and time. She has normal sensation and normal strength.   Skin: Skin is warm and dry. No rash noted.   Psychiatric: Mood and affect normal.     LAB RESULTS  Lab Results (last 24 hours)     Procedure Component Value " Units Date/Time    Tissue Pathology Exam [[id number]] Collected:  06/06/19 1009    Specimen:  Tissue from Esophagus, Distal Updated:  06/06/19 1119    Basic Metabolic Panel [[id number]] Collected:  06/06/19 0648    Specimen:  Blood Updated:  06/06/19 0756     Glucose 90 mg/dL      BUN 8 mg/dL      Creatinine 0.79 mg/dL      Sodium 137 mmol/L      Potassium 4.1 mmol/L      Chloride 103 mmol/L      CO2 24.7 mmol/L      Calcium 8.8 mg/dL      eGFR Non African Amer 72 mL/min/1.73      BUN/Creatinine Ratio 10.1     Anion Gap 9.3 mmol/L     Narrative:       GFR Normal >60  Chronic Kidney Disease <60  Kidney Failure <15    CBC & Differential [[id number]] Collected:  06/06/19 0648    Specimen:  Blood Updated:  06/06/19 0730    Narrative:       The following orders were created for panel order CBC & Differential.  Procedure                               Abnormality         Status                     ---------                               -----------         ------                     CBC Auto Differential[[id number]]        Abnormal            Final result                 Please view results for these tests on the individual orders.    CBC Auto Differential [[id number]]  (Abnormal) Collected:  06/06/19 0648    Specimen:  Blood Updated:  06/06/19 0730     WBC 5.08 10*3/mm3      RBC 3.38 10*6/mm3      Hemoglobin 10.0 g/dL      Hematocrit 31.9 %      MCV 94.4 fL      MCH 29.6 pg      MCHC 31.3 g/dL      RDW 12.6 %      RDW-SD 43.7 fl      MPV 8.8 fL      Platelets 213 10*3/mm3      Neutrophil % 52.0 %      Lymphocyte % 28.1 %      Monocyte % 13.4 %      Eosinophil % 5.1 %      Basophil % 1.0 %      Immature Grans % 0.4 %      Neutrophils, Absolute 2.64 10*3/mm3      Lymphocytes, Absolute 1.43 10*3/mm3      Monocytes, Absolute 0.68 10*3/mm3      Eosinophils, Absolute 0.26 10*3/mm3      Basophils, Absolute 0.05 10*3/mm3      Immature Grans, Absolute 0.02 10*3/mm3      nRBC 0.0 /100 WBC         Imaging Results (last 24 hours)     **  No results found for the last 24 hours. **        EKG                              Rhythm/Rate: NSR, 68  P waves and [place]: Normal  QRS, axis: LAD, no QRS conduction delay  ST and T waves: No acute ST changes       Current Facility-Administered Medications:   •  acetaminophen (TYLENOL) suppository 650 mg, 650 mg, Rectal, Q4H PRN, Lemuel Cain MD  •  aspirin chewable tablet 81 mg, 81 mg, Oral, Daily, Brian Rojas MD, 81 mg at 06/06/19 1136  •  baclofen (LIORESAL) tablet 10 mg, 10 mg, Oral, BID PRN, Brian Rojas MD  •  carbamide peroxide (DEBROX) 6.5 % otic solution 5 drop, 5 drop, Both Ears, BID, Brian Rojas MD, 5 drop at 06/06/19 1135  •  cetirizine (zyrTEC) tablet 10 mg, 10 mg, Oral, Daily, Thomas Avila MD, 10 mg at 06/06/19 1313  •  docusate sodium (COLACE) capsule 200 mg, 200 mg, Oral, BID, Dorota Christianson APRN, 200 mg at 06/06/19 1135  •  fluticasone (FLONASE) 50 MCG/ACT nasal spray 1 spray, 1 spray, Each Nare, BID, Dorota Christianson APRN, 1 spray at 06/06/19 1135  •  Glycerin-Hypromellose- (ARTIFICIAL TEARS) 0.2-0.2-1 % ophthalmic solution solution 1 drop, 1 drop, Both Eyes, Q3H PRN, Dorota Christianson APRN  •  guaiFENesin (MUCINEX) 12 hr tablet 600 mg, 600 mg, Oral, Q12H, Brian Rojas MD, 600 mg at 06/06/19 1136  •  ipratropium-albuterol (DUO-NEB) nebulizer solution 3 mL, 3 mL, Nebulization, Q4H - RT, Brian Rojas MD, 3 mL at 06/06/19 1106  •  lactated ringers infusion 1,000 mL, 1,000 mL, Intravenous, Continuous, Jovanni Chavez MD, Last Rate: 25 mL/hr at 06/06/19 0849, 1,000 mL at 06/06/19 0849  •  levothyroxine (SYNTHROID, LEVOTHROID) tablet 100 mcg, 100 mcg, Oral, QAM, Dorota Christianson APRN, 100 mcg at 06/06/19 1136  •  melatonin tablet 2 mg, 2 mg, Oral, Nightly PRN, Shante Mcmillan MD, 2 mg at 06/05/19 2250  •  montelukast (SINGULAIR) tablet 10 mg, 10 mg, Oral, Nightly, Dorota Christianson APRN, 10 mg at 06/05/19 2128  •  morphine injection 2 mg, 2 mg, Intravenous, Q4H PRN, Dorota Christianson  JOSEFINA APRN, 2 mg at 06/06/19 0604  •  morphine PF (DURAMORPH) 200 mg, bupivacaine (MARCAINE) 0.5 % 20 mL compound, , Intrathecal, Once, LisaorTyrell MD  •  ondansetron (ZOFRAN) injection 4 mg, 4 mg, Intravenous, Q6H PRN, Suzy Rojas MD, 4 mg at 06/06/19 1313  •  ondansetron (ZOFRAN) tablet 8 mg, 8 mg, Oral, Once, Thomas Avila MD  •  pantoprazole (PROTONIX) EC tablet 40 mg, 40 mg, Oral, BID AC, Suzy Rojas MD, 40 mg at 06/06/19 1135  •  phenytoin (DILANTIN) ER capsule 300 mg, 300 mg, Oral, Daily, Dorota Christianson APRN, 300 mg at 06/05/19 2128  •  polyethylene glycol (MIRALAX) powder 17 g, 17 g, Oral, BID, Suzy Rojas MD, 17 g at 06/06/19 1135  •  potassium chloride (MICRO-K) CR capsule 10 mEq, 10 mEq, Oral, BID With Meals, Dorota Christianson APRN, 10 mEq at 06/06/19 1136  •  propranolol (INDERAL) tablet 80 mg, 80 mg, Oral, TID, Dorota Christianson APRN, 80 mg at 06/06/19 1136  •  sodium chloride 0.9 % flush 10 mL, 10 mL, Intravenous, PRN, Elder Dominguez MD, 10 mL at 06/06/19 1037  •  sodium chloride 0.9 % with KCl 20 mEq/L infusion, 50 mL/hr, Intravenous, Continuous, Suzy Rojas MD, Stopped at 06/06/19 1032  •  sodium chloride nasal spray 2 spray, 2 spray, Each Nare, PRN, Lemuel Cain MD  •  sucralfate (CARAFATE) 1 GM/10ML suspension 1 g, 1 g, Oral, 4x Daily AC & at Bedtime, Suzy Rojas MD, 1 g at 06/04/19 2159     ASSESSMENT  Atypical chest pain  Winston's esophagus  Hiatal hernia  Diverticulosis  Hypertension  Hyperlipidemia  Hypothyroidism  COPD  Seizure disorder  Chronic anemia  Gastroesophageal reflux disease    PLAN  CPM  Protonix and Carafate  Continue home medications  Stress ulcer DVT prophylaxis  Supportive care  PT/OT  Okay to discharge    SUZY ROJAS MD

## 2019-06-06 NOTE — DISCHARGE SUMMARY
Date of Discharge:  6/6/2019    Discharge Diagnosis: Noncardiac chest pain    Presenting Problem/History of Present Illness  Precordial pain [R07.2]     Patient Active Problem List   Diagnosis   • Dizzy   • Visual changes   • Generalized weakness   • Acute nonintractable headache   • Arthritis   • Brain aneurysm   • COPD (chronic obstructive pulmonary disease) (CMS/HCC)   • Disease of thyroid gland   • Legally blind   • GERD (gastroesophageal reflux disease)   • Migraine   • Seizures (CMS/HCC)   • Nausea & vomiting   • Abdominal pain, epigastric   • Precordial pain   • Non-intractable vomiting with nausea            Hospital Course  Patient is a 68 y.o. female presented with chest discomfort which began on the afternoon of 6/3/2019.  This was associated with chills and nausea and emesis.  The chest discomfort was diffuse over the entire anterior chest.  There was no associated shortness of air.  He did have a complaint of new sinus congestion.  Chest pain was felt to be atypical for cardiac pain.  She had 2- troponins in the emergency room.  CT angiogram of the chest was negative for pulmonary embolism and showed no infiltrate.  Her EKG did not demonstrate acute changes.  It was felt that from a clinical standpoint this chest pain did not appear to be cardiac.  She did not have any further work-up of that.  She sees Dr. Orellana checked in our office.  She sees him for palpitations and has been on some propranolol for quite some time.  The dose each is a little unclear what she had been on.  Currently she is on 80 mg 3 times a day which was started I think at admission a few days ago.  She is tolerating that heart rate wise and blood pressure wise so I will continue that at discharge and Dr. Orellana can decide about the dosage when he sees her later.  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  She will be continued on PPIs  chronically for that per Dr. Chavez's recommendation.  He is complaining of some nasal congestion sore throat etc. since admission.  She will be given an antihistamine for that and continues on the nasal spray and Mucinex.  She will be advised to follow-up with her primary care provider for that.  For other prior diagnoses see the admission history and physical and other progress notes.  These were not acute problems during her hospital stay.  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  Long-term PPI suggested with no Carafate.  Ejection Fraction  No results found for: EF    Condition on Discharge: Stable    Physical Exam at Discharge    Vital Signs  Temp:  [97.8 °F (36.6 °C)-98.7 °F (37.1 °C)] 98.7 °F (37.1 °C)  Heart Rate:  [58-72] 68  Resp:  [12-18] 16  BP: (124-172)/(52-83) 157/76  Wt Readings from Last 4 Encounters:   06/05/19 74.1 kg (163 lb 6.4 oz)   04/24/19 72.1 kg (159 lb)   04/12/19 77.5 kg (170 lb 12.8 oz)   03/31/19 74.6 kg (164 lb 6.4 oz)      General Appearance:    Alert, cooperative, in no acute distress   Head:    Normocephalic, without obvious abnormality, atraumatic   Eyes:            Conjunctivae normal, no   icterus   Neck:   No adenopathy, supple, trachea midline, no thyromegaly, no   carotid bruit, no JVD   Lungs:     Clear to auscultation,respirations regular, even and                  unlabored    Heart:     Regular rhythm and normal rate, normal S1 and S2,            No murmur, no gallop, no rub, no  click   Chest Wall:    No abnormalities observed   Abdomen:     Normal bowel sounds, no masses, no organomegaly, soft        non-tender, non-distended, no guarding, no rebound                tenderness   Rectal:     Deferred   Extremities:   No edema. Moves all extremities well, no cyanosis, no erythema   Pulses:   Pulses palpable and equal bilaterally   Skin:   No bleeding, bruising or rash   Neurologic:   Cranial nerves 2 - 12 grossly intact, sensation intact, DTR       present and equal bilaterally          Discharge Disposition home      Discharge Medications     Discharge Medications      ASK your doctor about these medications      Instructions Start Date   ARTIFICIAL TEARS 1.4 % ophthalmic solution  Generic drug:  polyvinyl alcohol   No dose, route, or frequency recorded.      azelastine 0.1 % nasal spray  Commonly known as:  ASTELIN   instill 1 spray into each nostril twice a day      azithromycin 250 MG tablet  Commonly known as:  ZITHROMAX   250 mg, Oral, Daily, Take 2 tablets the first day, then 1 tablet daily for 4 days.       baclofen 10 MG tablet  Commonly known as:  LIORESAL   take 1 tablet by mouth twice a day (LATE AFTERNOON AND BEDTIME)      fluticasone 50 MCG/ACT nasal spray  Commonly known as:  FLONASE   No dose, route, or frequency recorded.      furosemide 40 MG tablet  Commonly known as:  LASIX   40 mg, 2 Times Daily      INCRUSE ELLIPTA 62.5 MCG/INH aerosol powder   Generic drug:  Umeclidinium Bromide   No dose, route, or frequency recorded.      levothyroxine 100 MCG tablet  Commonly known as:  SYNTHROID, LEVOTHROID   100 mcg, Daily      meclizine 25 MG tablet  Commonly known as:  ANTIVERT   25 mg, Oral, 2 Times Daily PRN      montelukast 10 MG tablet  Commonly known as:  SINGULAIR   10 mg, Nightly      O2  Commonly known as:  OXYGEN   2 L/min, Inhalation, Once      omeprazole 40 MG capsule  Commonly known as:  priLOSEC   take 1 capsule by mouth twice a day      ondansetron 8 MG  tablet  Commonly known as:  ZOFRAN   take 1 tablet by mouth three times a day PRN      phenytoin 100 MG ER capsule  Commonly known as:  DILANTIN   take 3 capsules by mouth at bedtime      potassium chloride 10 MEQ CR tablet  Commonly known as:  K-DUR   10 mEq, 2 Times Daily      pramipexole 0.25 MG tablet  Commonly known as:  MIRAPEX   0.25 mg, Oral, Nightly PRN      propranolol 20 MG tablet  Commonly known as:  INDERAL   20 mg, Oral, 2 Times Daily      propranolol 80 MG tablet  Commonly known as:  INDERAL   80 mg, 3 Times Daily      topiramate 25 MG tablet  Commonly known as:  TOPAMAX   25 mg, Oral, 2 Times Daily, Take two tablets in the morning and two tablets at bedtime.       TRELEGY ELLIPTA 100-62.5-25 MCG/INH aerosol powder   Generic drug:  Fluticasone-Umeclidin-Vilant   1 each, Inhalation, Daily             Discharge Diet: Cardiac    Activity at Discharge: Gradually resume usual activities    Follow-up Appointments  Make appointment to see Dr. Orellana in our group in a few weeks for cardiac follow-up  Follow-up with primary care provider in 1 to 2 weeks for follow-up of the allergies involving her upper airways and follow-up of the GI issues    Test Results at Discharge  Lab Results   Component Value Date    GLUCOSE 90 06/06/2019    CALCIUM 8.8 06/06/2019     06/06/2019    K 4.1 06/06/2019    CO2 24.7 06/06/2019     06/06/2019    BUN 8 06/06/2019    CREATININE 0.79 06/06/2019    EGFRIFNONA 72 06/06/2019    BCR 10.1 06/06/2019    ANIONGAP 9.3 06/06/2019        Lab Results   Component Value Date    GLUCOSE 90 06/06/2019    BUN 8 06/06/2019    CREATININE 0.79 06/06/2019    EGFRIFNONA 72 06/06/2019    BCR 10.1 06/06/2019    CO2 24.7 06/06/2019    CALCIUM 8.8 06/06/2019    ALBUMIN 3.30 (L) 06/04/2019    AST 18 06/04/2019    ALT 11 06/04/2019        No results found for: TROPONINT, MG    Lab Results   Component Value Date    WBC 5.08 06/06/2019    HGB 10.0 (L) 06/06/2019    HCT 31.9 (L) 06/06/2019     MCV 94.4 06/06/2019     06/06/2019      Lab Results   Component Value Date    CHOL 199 06/04/2019     Lab Results   Component Value Date    HDL 78 (H) 06/04/2019     Lab Results   Component Value Date     (H) 06/04/2019     Lab Results   Component Value Date    TRIG 93 06/04/2019     No components found for: CHOLHDL   Lab Results   Component Value Date    HGBA1C 5.30 06/04/2019      Lab Results   Component Value Date    TSH 1.630 06/04/2019           Thomas Avila MD  06/06/19  12:08 PM    Time: 40 min

## 2019-06-06 NOTE — ANESTHESIA PREPROCEDURE EVALUATION
Anesthesia Evaluation     Patient summary reviewed and Nursing notes reviewed   history of anesthetic complications (hypotension):  NPO Solid Status: > 8 hours  NPO Liquid Status: > 8 hours           Airway   Mallampati: III  TM distance: <3 FB  Neck ROM: full  Possible difficult intubation  Dental - normal exam     Pulmonary - normal exam    breath sounds clear to auscultation  (+) COPD, asthma,   Cardiovascular     Rhythm: regular  Rate: normal    (+) CHF, murmur,   (-) PVD      Neuro/Psych  (+) seizures, CVA, headaches, dizziness/light headedness,       ROS Comment: Brain aneurysms  GI/Hepatic/Renal/Endo    (+)  GERD,      Musculoskeletal     Abdominal  - normal exam   Substance History - negative use     OB/GYN negative ob/gyn ROS         Other   (+) arthritis                     Anesthesia Plan    ASA 3     MAC     intravenous induction   Anesthetic plan, all risks, benefits, and alternatives have been provided, discussed and informed consent has been obtained with: patient.

## 2019-06-06 NOTE — PROGRESS NOTES
Continued Stay Note  [facility]     Patient Name: Emilia Bryant  MRN: [id number]  Today's Date: 6/6/2019    Admit Date: 6/2/2019    Discharge Plan     Row Name 06/06/19 1543       Plan    Plan  Rehab     Plan Comments  Spoke with Claudette Tse, pt is now wanting to go to rehab. PT to eval pt today. Pt will need a cert for rehab. Pt would like referrals made to [facility] Reddy notified and Meagan Wright notified. CCP to follow. JChasteenRN/CCP                               Discharge Codes    No documentation.       Expected Discharge Date and Time     Expected Discharge Date Expected Discharge Time    Jun 6, 2019             Shanda Ortega RN

## 2019-06-06 NOTE — PROGRESS NOTES
Continued Stay Note  [facility]     Patient Name: Emilia Bryant  MRN: [id number]  Today's Date: 6/6/2019    Admit Date: 6/2/2019    Discharge Plan     Row Name 06/06/19 1611       Plan    Plan  Home with help from her grandaughter and VNA      Plan Comments  Spoke with PT, pt is weak and unsteady. However the pt walked 150ft with handheld assist. Explained to pt at bedside she would have to stay here another night for pre-cert. Pt stated her grandaughter can now stay with her for at least the next 3days and she wants to just go home with A . Pt agreeable to CCP calling to update pts dtr Carmita Valverde [phone]. Claudette CORTEZ updated. CCP to follow. JChasteenRN/CCP            Discharge Codes    No documentation.       Expected Discharge Date and Time     Expected Discharge Date Expected Discharge Time    Jun 6, 2019             Shanda Ortega RN

## 2019-06-06 NOTE — PLAN OF CARE
Problem: Patient Care Overview  Goal: Plan of Care Review   06/06/19 9034   Coping/Psychosocial   Plan of Care Reviewed With patient   OTHER   Outcome Summary Patient is a pleasant 68 y.o. female admitted to [facility] for precordial chest pain on 6/2/2019. Patient is legally blind at baseline and lives at home alone- independent with her SPC. Today, patient performed bed mobility with SV, required SBA for transfers, and ambulated 150' CGA-Jayjay with HHA. Although patient able to ambulate 150', patient demo'd balance deficits and was very unsteady throughout ambulation trial. Patient reported numerous times she did not feel comfortable returning home alone and family could only assist her for one day. Based on balance deficits and patient's visual deficits, patient may benefit from skilled PT services acutely to address functional deficits as well as improve level of independence prior to discharge.

## 2019-06-06 NOTE — PLAN OF CARE
Problem: Patient Care Overview  Goal: Plan of Care Review  Outcome: Outcome(s) achieved Date Met: 06/06/19 06/06/19 1631   Coping/Psychosocial   Plan of Care Reviewed With patient   Plan of Care Review   Progress improving   OTHER   Outcome Summary pt improved-d/c'd home     Goal: Individualization and Mutuality  Outcome: Outcome(s) achieved Date Met: 06/06/19    Goal: Discharge Needs Assessment  Outcome: Outcome(s) achieved Date Met: 06/06/19    Goal: Interprofessional Rounds/Family Conf  Outcome: Outcome(s) achieved Date Met: 06/06/19

## 2019-06-06 NOTE — THERAPY EVALUATION
Acute Care - Physical Therapy Initial Evaluation  [facility]     Patient Name: Emilia Bryant  : 1950  MRN: [id number]  Today's Date: 2019      Date of Referral to PT: 19  Referring Physician: Dr. Avila      Admit Date: 2019    Visit Dx:     ICD-10-CM ICD-9-CM   1. Precordial pain R07.2 786.51   2. Non-intractable vomiting with nausea, unspecified vomiting type R11.2 787.01   3. Abdominal pain, epigastric R10.13 789.06   4. Impaired functional mobility, balance, gait, and endurance Z74.09 V49.89     Patient Active Problem List   Diagnosis   • Dizzy   • Visual changes   • Generalized weakness   • Acute nonintractable headache   • Arthritis   • Brain aneurysm   • COPD (chronic obstructive pulmonary disease) (CMS/HCC)   • Disease of thyroid gland   • Legally blind   • GERD (gastroesophageal reflux disease)   • Migraine   • Seizures (CMS/HCC)   • Nausea & vomiting   • Abdominal pain, epigastric   • Precordial pain   • Non-intractable vomiting with nausea   • Non-cardiac chest pain     Past Medical History:   Diagnosis Date   • Arthritis    • Asthma    • Brain aneurysm    • CHF (congestive heart failure) (CMS/HCC)    • COPD (chronic obstructive pulmonary disease) (CMS/HCC)    • Disease of thyroid gland    • GERD (gastroesophageal reflux disease)    • Legally blind    • Migraine    • Seizures (CMS/HCC)    • Stroke (CMS/HCC)      Past Surgical History:   Procedure Laterality Date   • CEREBRAL ANEURYSM REPAIR     •  SECTION     • CHOLECYSTECTOMY     • CRANIOTOMY     • HEMORRHOIDECTOMY     • HERNIA REPAIR     • HYSTERECTOMY     • WRIST SURGERY          PT ASSESSMENT (last 12 hours)      Physical Therapy Evaluation     Row Name 19 1552          PT Evaluation Time/Intention    Subjective Information  complains of;weakness;fatigue  -MS     Document Type  evaluation  -MS     Mode of Treatment  physical therapy  -MS     Patient Effort  good  -MS     Symptoms Noted During/After Treatment   fatigue  -MS     Comment  Very unsteady with ambulation- veering to L primarily.  -MS     Row Name 06/06/19 9807          General Information    Patient Profile Reviewed?  yes  -MS     Referring Physician  Dr. Avila  -MS     Patient Observations  alert;cooperative;agree to therapy  -MS     Patient/Family Observations  Resting in bed with HOB elevated, NAD, exit alarm on  -MS     Prior Level of Function  independent:;all household mobility lives alone  -MS     Equipment Currently Used at Home  cane, straight  -MS     Pertinent History of Current Functional Problem  Admission from home for precordial pain. CCP requested PT to see patient to assess DC planning. Patient reported she hasn't been allowed get to up since admission and reports feeling very weak and unsteady now. Patient is legally blind.  -MS     Existing Precautions/Restrictions  fall  -MS     Limitations/Impairments  safety/cognitive  -MS     Risks Reviewed  patient:  -MS     Benefits Reviewed  patient:  -MS     Barriers to Rehab  visual deficit  -MS     Row Name 06/06/19 1558          Cognitive Assessment/Intervention- PT/OT    Orientation Status (Cognition)  oriented x 4  -MS     Follows Commands (Cognition)  WFL  -MS     Safety Deficit (Cognitive)  moderate deficit  -MS     Personal Safety Interventions  fall prevention program maintained;gait belt;nonskid shoes/slippers when out of bed  -MS     Row Name 06/06/19 1557          Safety Issues, Functional Mobility    Impairments Affecting Function (Mobility)  strength;endurance/activity tolerance;balance  -MS     Row Name 06/06/19 0495          Bed Mobility Assessment/Treatment    Bed Mobility Assessment/Treatment  supine-sit;sit-supine  -MS     Supine-Sit [place] (Bed Mobility)  supervision  -MS     Sit-Supine [place] (Bed Mobility)  supervision  -MS     Bed Mobility, Safety Issues  decreased use of legs for bridging/pushing;impaired trunk control for bed mobility  -MS     Assistive Device  (Bed Mobility)  bed rails;head of bed elevated  -MS     Row Name 06/06/19 1552          Transfer Assessment/Treatment    Transfer Assessment/Treatment  sit-stand transfer;stand-sit transfer  -MS     Comment (Transfers)  Sequencing cues.  -MS     Sit-Stand [place] (Transfers)  stand by assist;verbal cues;nonverbal cues (demo/gesture)  -MS     Stand-Sit [place] (Transfers)  stand by assist;verbal cues;nonverbal cues (demo/gesture)  -MS     Row Name 06/06/19 1552          Sit-Stand Transfer    Assistive Device (Sit-Stand Transfers)  -- HHAx1  -MS     Row Name 06/06/19 1552          Stand-Sit Transfer    Assistive Device (Stand-Sit Transfers)  -- HHAx1  -MS     Row Name 06/06/19 1552          Gait/Stairs Assessment/Training    [place] Level (Gait)  contact guard;minimum assist (75% patient effort);verbal cues;nonverbal cues (demo/gesture)  -MS     Assistive Device (Gait)  -- HHAx1  -MS     Distance in Feet (Gait)  150  -MS     Pattern (Gait)  step-through  -MS     Comment (Gait/Stairs)  Unsteady throughout, veering to the L with ambulation. Patient reported feeling weak during ambulation trial. Required navigation cues due to visual deficits.  -MS     Row Name 06/06/19 1552          General ROM    GENERAL ROM COMMENTS  B LE WFL  -MS     Row Name 06/06/19 1552          MMT (Manual Muscle Testing)    General MMT Comments  B LEs grossly 4/5  -MS     Row Name 06/06/19 1552          Motor Assessment/Intervention    Additional Documentation  Balance (Group);Balance Interventions (Group)  -MS     Row Name 06/06/19 1552          Balance    Balance  static sitting balance;static standing balance  -MS     Row Name 06/06/19 1552          Static Sitting Balance    Level of [place] (Unsupported Sitting, Static Balance)  conditional independence  -MS     Sitting Position (Unsupported Sitting, Static Balance)  sitting on edge of bed  -MS     Row Name 06/06/19 1552          Static Standing Balance    Level of  "[place] (Supported Standing, Static Balance)  contact guard assist;minimal assist, 75% patient effort  -MS     Assistive Device Utilized (Supported Standing, Static Balance)  -- HHAx1  -MS     Row Name 06/06/19 1552          Sensory Assessment/Intervention    Sensory General Assessment  no sensation deficits identified  -MS     Row Name 06/06/19 1552          Vision Assessment/Intervention    Visual Impairment/Limitations  WFL  -MS     Row Name 06/06/19 1552          Pain Assessment    Additional Documentation  -- Pain reported in B knees from arthritis- this is chronic  -MS     Row Name 06/06/19 1552          Plan of Care Review    Plan of Care Reviewed With  patient  -MS     Row Name 06/06/19 1552          Physical Therapy Clinical Impression    Date of Referral to PT  06/06/19  -MS     PT Diagnosis (PT Clinical Impression)  impaired functional mobility and balance  -MS     Criteria for Skilled Interventions Met (PT Clinical Impression)  yes;treatment indicated  -MS     Impairments Found (describe specific impairments)  aerobic capacity/endurance;gait, locomotion, and balance  -MS     Rehab Potential (PT Clinical Summary)  good, to achieve stated therapy goals  -MS     Care Plan Review (PT)  patient/other agree to care plan  -MS     Patient/Family Concerns, Equipment Needs at Discharge (PT)  Patient reported numerous times that she was \"scared to death\" to return home. Asked patient if she had any family that could assist her at home and she said her granddaughter could stay with her overnight one night only. Discussed patient's concerns with CCP.  -MS     Row Name 06/06/19 1552          Vital Signs    O2 Delivery Pre Treatment  room air  -MS     Row Name 06/06/19 1552          Physical Therapy Goals    Bed Mobility Goal Selection (PT)  bed mobility, PT goal 1  -MS     Transfer Goal Selection (PT)  transfer, PT goal 1  -MS     Gait Training Goal Selection (PT)  gait training, PT goal 1  -MS     Row Name " 06/06/19 1552          Transfer Goal 1 (PT)    Activity/Assistive Device (Transfer Goal 1, PT)  transfers, all;cane, straight  -MS     [place] Level/Cues Needed (Transfer Goal 1, PT)  supervision required  -MS     Time Frame (Transfer Goal 1, PT)  1 week  -MS     Progress/Outcome (Transfer Goal 1, PT)  goal ongoing  -MS     Row Name 06/06/19 1552          Gait Training Goal 1 (PT)    Activity/Assistive Device (Gait Training Goal 1, PT)  gait (walking locomotion);cane, straight  -MS     [place] Level (Gait Training Goal 1, PT)  supervision required  -MS     Distance (Gait Goal 1, PT)  200  -MS     Time Frame (Gait Training Goal 1, PT)  1 week  -MS     Progress/Outcome (Gait Training Goal 1, PT)  goal ongoing  -MS     Row Name 06/06/19 1552          Positioning and Restraints    Pre-Treatment Position  in bed  -MS     Post Treatment Position  bed  -MS     In Bed  fowlers;call light within reach;encouraged to call for assist  -MS     Row Name 06/06/19 1552          Living Environment    Home Accessibility  -- No stairs to enter home.  -MS       User Key  (r) = Recorded By, (t) = Taken By, (c) = Cosigned By    Initials Name Provider Type    MS Ami Ny, PT Physical Therapist        Physical Therapy Education     Title: PT OT SLP Therapies (In Progress)     Topic: Physical Therapy (In Progress)     Point: Mobility training (Done)     Learning Progress Summary           Patient Acceptance, E, VU,NR by MS at 6/6/2019  4:07 PM                   Point: Body mechanics (Done)     Learning Progress Summary           Patient Acceptance, E, VU,NR by MS at 6/6/2019  4:07 PM                   Point: Precautions (Done)     Learning Progress Summary           Patient Acceptance, E, VU,NR by MS at 6/6/2019  4:07 PM                               User Key     Initials Effective Dates Name Provider Type Discipline    MS 03/04/19 -  Ami Ny, PT Physical Therapist PT              PT Recommendation and  "Plan  Anticipated Discharge Disposition (PT): home with assist, home with home health, skilled nursing facility(pending patient progress made)  Planned Therapy Interventions (PT Eval): balance training, bed mobility training, gait training, home exercise program, patient/family education, postural re-education, ROM (range of motion), stair training, strengthening, transfer training  Therapy Frequency (PT Clinical Impression): daily  Outcome Summary/Treatment Plan (PT)  Patient/Family Concerns, Equipment Needs at Discharge (PT): Patient reported numerous times that she was \"scared to death\" to return home. Asked patient if she had any family that could assist her at home and she said her granddaughter could stay with her overnight one night only. Discussed patient's concerns with CCP.  Anticipated Discharge Disposition (PT): home with assist, home with home health, skilled nursing facility(pending patient progress made)  Plan of Care Reviewed With: patient  Outcome Summary: Patient is a pleasant 68 y.o. female admitted to [facility] for precordial chest pain on 6/2/2019. Patient is legally blind at baseline and lives at home alone- independent with her SPC. Today, patient performed bed mobility with SV, required SBA for transfers, and ambulated 150' CGA-Jayjay with HHA. Although patient able to ambulate 150', patient demo'd balance deficits and was very unsteady throughout ambulation trial. Patient reported numerous times she did not feel comfortable returning home alone and family could only assist her for one day. Based on balance deficits and patient's visual deficits, patient may benefit from skilled PT services acutely to address functional deficits as well as improve level of independence prior to discharge.  Outcome Measures     Row Name 06/06/19 1600             How much help from another person do you currently need...    Turning from your back to your side while in flat bed without using bedrails?  4  -MS      " Moving from lying on back to sitting on the side of a flat bed without bedrails?  4  -MS      Moving to and from a bed to a chair (including a wheelchair)?  3  -MS      Standing up from a chair using your arms (e.g., wheelchair, bedside chair)?  3  -MS      Climbing 3-5 steps with a railing?  2  -MS      To walk in hospital room?  3  -MS      AM-PAC 6 Clicks Score  19  -MS         Functional Assessment    Outcome Measure Options  AM-PAC 6 Clicks Basic Mobility (PT)  -MS        User Key  (r) = Recorded By, (t) = Taken By, (c) = Cosigned By    Initials Name Provider Type    MS CardososAmi, PT Physical Therapist         Time Calculation:   PT Charges     Row Name 06/06/19 1551             Time Calculation    Start Time  1535  -MS      Stop Time  1551  -MS      Time Calculation (min)  16 min  -MS      PT Received On  06/06/19  -MS      PT - Next Appointment  06/07/19  -MS      PT Goal Re-Cert Due Date  06/13/19  -MS        User Key  (r) = Recorded By, (t) = Taken By, (c) = Cosigned By    Initials Name Provider Type    MS CardososAmi, PT Physical Therapist        Therapy Charges for Today     Code Description Service Date Service Provider Modifiers Qty    09376323537 HC PT EVAL MOD COMPLEXITY 2 6/6/2019 Ami Ny, PT GP 1    62016064617 HC PT THER PROC EA 15 MIN 6/6/2019 Ami Ny, PT GP 1          PT G-Codes  Outcome Measure Options: AM-PAC 6 Clicks Basic Mobility (PT)  AM-PAC 6 Clicks Score: 19      Ami Ny PT  6/6/2019

## 2019-06-06 NOTE — ADDENDUM NOTE
Addendum  created 06/06/19 1147 by Nadege Bazan MD    Review and Sign - Ready for Procedure, Review and Sign - Signed

## 2019-06-06 NOTE — PLAN OF CARE
Problem: Patient Care Overview  Goal: Plan of Care Review  Outcome: Ongoing (interventions implemented as appropriate)   06/06/19 0525   Coping/Psychosocial   Plan of Care Reviewed With patient   Plan of Care Review   Progress improving   OTHER   Outcome Summary VSS; pt c/o pain & nausea; c/o earache from wax buildup, drops given; pt anxious at times; c/o nose being dry from O2, nasal spray used; c/o nosebleed - no blood seen; IVF cont; NPO since MN for EGD; will cont to monitor       Problem: Cardiac: ACS (Acute Coronary Syndrome) (Adult)  Goal: Signs and Symptoms of Listed Potential Problems Will be Absent, Minimized or Managed (Cardiac: ACS)  Outcome: Ongoing (interventions implemented as appropriate)      Problem: Fall Risk (Adult)  Goal: Identify Related Risk Factors and Signs and Symptoms  Outcome: Outcome(s) achieved Date Met: 06/06/19    Goal: Absence of Fall  Outcome: Ongoing (interventions implemented as appropriate)      Problem: Skin Injury Risk (Adult)  Goal: Identify Related Risk Factors and Signs and Symptoms  Outcome: Outcome(s) achieved Date Met: 06/06/19    Goal: Skin Health and Integrity  Outcome: Ongoing (interventions implemented as appropriate)      Problem: Pain, Chronic (Adult)  Goal: Identify Related Risk Factors and Signs and Symptoms  Outcome: Outcome(s) achieved Date Met: 06/06/19    Goal: Acceptable Pain/Comfort Level and Functional Ability  Outcome: Ongoing (interventions implemented as appropriate)

## 2019-06-06 NOTE — PROGRESS NOTES
Brief (courtesy) procedure note:    Procedure: EGD    Pre-op diagnosis: epigastric pain    Post-op diagnosis: Medium hiatal hernia. Possible short-segment Winston's. Carafate pill in esophagus made exam more difficult.     Recommendations: Path. Long term PPI. Suggest esomeprazole or rabeprazole when discharged. No Carafate.     Please refer to the Provation-generated note for photos and further details.     Jovanni Chavez MD

## 2019-06-06 NOTE — ANESTHESIA POSTPROCEDURE EVALUATION
Patient: Emilia Bryant    Procedure Summary     Date:  06/06/19 Room / Location:   [facility]    Anesthesia Start:  0956 Anesthesia Stop:  1017    Procedure:  ESOPHAGOGASTRODUODENOSCOPY WITH BIOPSIES (N/A Esophagus) Diagnosis:       Non-intractable vomiting with nausea, unspecified vomiting type      Abdominal pain, epigastric      (Non-intractable vomiting with nausea, unspecified vomiting type [R11.2])      (Abdominal pain, epigastric [R10.13])    Surgeon:  Jovanni Chavez MD Provider:  Nadege Bazan MD    Anesthesia Type:  MAC ASA Status:  3          Anesthesia Type: MAC  Last vitals  BP   144/57 (06/06/19 1026)   Temp   37.1 °C (98.7 °F) (06/06/19 0845)   Pulse   66 (06/06/19 1026)   Resp   16 (06/06/19 1026)     SpO2   97 % (06/06/19 1026)     Post Anesthesia Care and Evaluation    Patient location during evaluation: PACU  Patient participation: complete - patient participated  Level of consciousness: awake and alert  Pain management: adequate  Airway patency: patent  Anesthetic complications: No anesthetic complications  PONV Status: none  Cardiovascular status: acceptable  Respiratory status: acceptable  Hydration status: acceptable

## 2019-06-06 NOTE — PROGRESS NOTES
Continued Stay Note  [facility]     Patient Name: Emilia Bryant  MRN: [id number]  Today's Date: 6/6/2019    Admit Date: 6/2/2019    Discharge Plan     Row Name 06/06/19 1445       Plan    Plan  Home with [facility]     Plan Comments  Spoke with pt at bedside, she would like [facility]. She is being discharged today. Millicent/[facility] notified. JChasteenRN/CCP         Discharge Codes    No documentation.       Expected Discharge Date and Time     Expected Discharge Date Expected Discharge Time    Jun 6, 2019             Shanda Ortega RN

## 2019-06-07 ENCOUNTER — READMISSION MANAGEMENT (OUTPATIENT)
Dept: CALL CENTER | Facility: HOSPITAL | Age: 69
End: 2019-06-07

## 2019-06-07 LAB
CYTO UR: NORMAL
LAB AP CASE REPORT: NORMAL
PATH REPORT.FINAL DX SPEC: NORMAL
PATH REPORT.GROSS SPEC: NORMAL

## 2019-06-07 NOTE — PROGRESS NOTES
Case Management Discharge Note    Final Note: Pt dc'd home with [facility]    Destination      No service has been selected for the patient.      Durable Medical Equipment      No service has been selected for the patient.      Dialysis/Infusion      No service has been selected for the patient.      Home Medical Care - Selection Complete      Service Provider Request Status Selected Services Address Phone Number Fax Number    [facility] [address] [place] [address] [phone]      Therapy      No service has been selected for the patient.      Community Resources      No service has been selected for the patient.        Transportation Services  Private: Car    Final Discharge Disposition Code: 06 - home with home health care

## 2019-06-08 NOTE — OUTREACH NOTE
Prep Survey      Responses   Facility patient discharged from?  [place]   Is patient eligible?  Yes   Discharge diagnosis  non-cardiac chest pain, epigastric pain   Does the patient have one of the following disease processes/diagnoses(primary or secondary)?  Other   Does the patient have Home health ordered?  Yes   What is the Home health agency?   [facility]   Is there a DME ordered?  No   Prep survey completed?  Yes          Malinda Orta RN

## 2019-06-10 ENCOUNTER — READMISSION MANAGEMENT (OUTPATIENT)
Dept: CALL CENTER | Facility: HOSPITAL | Age: 69
End: 2019-06-10

## 2019-06-10 NOTE — OUTREACH NOTE
Medical Week 1 Survey      Responses   Facility patient discharged from?  [place]   Does the patient have one of the following disease processes/diagnoses(primary or secondary)?  Other   Is there a successful TCM telephone encounter documented?  No   Week 1 attempt successful?  Yes   Call start time  1449   Call end time  1454   Discharge diagnosis  non-cardiac chest pain, epigastric pain   Is patient permission given to speak with other caregiver?  Yes   List who call center can speak with  Carmita, daughter   Person spoke with today (if not patient) and relationship  Carmita, daughter   Meds reviewed with patient/caregiver?  Yes [Reviewed new, changed and DC'd meds with daughter]   Is the patient having any side effects they believe may be caused by any medication additions or changes?  No   Does the patient have all medications ordered at discharge?  Yes   Is the patient taking all medications as directed (includes completed medication regime)?  Yes [Daughter states that  nurse has been there and reviewed meds. ]   Does the patient have a primary care provider?   Yes   Does the patient have an appointment with their PCP within 7 days of discharge?  Yes   Has the patient kept scheduled appointments due by today?  N/A   What [facility] the Home health agency?   [facility]    Has home health visited the patient within 72 hours of discharge?  Yes   Psychosocial issues?  No   Did the patient receive a copy of their discharge instructions?  Yes   Nursing interventions  Reviewed instructions with patient   What is the patient's perception of their health status since discharge?  Improving   Is the patient/caregiver able to teach back signs and symptoms related to disease process for when to call PCP?  Yes   Is the patient/caregiver able to teach back signs and symptoms related to disease process for when to call 911?  Yes   Is the patient/caregiver able to teach back the hierarchy of who to call/visit for symptoms/problems?  PCP, Specialist, Home health nurse, Urgent Care, ED, 911  Yes   Week 1 call completed?  Yes          Shanelle Berg RN

## 2019-06-18 ENCOUNTER — READMISSION MANAGEMENT (OUTPATIENT)
Dept: CALL CENTER | Facility: HOSPITAL | Age: 69
End: 2019-06-18

## 2019-06-18 NOTE — OUTREACH NOTE
Medical Week 2 Survey      Responses   Facility patient discharged from?  [place]   Does the patient have one of the following disease processes/diagnoses(primary or secondary)?  Other   Week 2 attempt successful?  Yes   Call start time  0931   Discharge diagnosis  non-cardiac chest pain, epigastric pain   Call end time  0939   Meds reviewed with patient/caregiver?  Yes   Is the patient having any side effects they believe may be caused by any medication additions or changes?  No   Does the patient have all medications ordered at discharge?  Yes   Is the patient taking all medications as directed (includes completed medication regime)?  Yes   Does the patient have a primary care provider?   Yes   Does the patient have an appointment with their PCP within 7 days of discharge?  Yes   Comments regarding PCP  Patient has at home visiting APRN    Has the patient kept scheduled appointments due by today?  Yes   What is the Home health agency?   [facility]   Has home health visited the patient within 72 hours of discharge?  Yes   Psychosocial issues?  No   Comments  HH is coming to see. Patient is not feeling well. Still with nausea and poor po intake. No chest pain. She reports visiting APRN coming to home again this week. If she does not improve with symptoms and stay hydrated seek medical attention immediately. She verbalized understanding.    Did the patient receive a copy of their discharge instructions?  Yes   Nursing interventions  Reviewed instructions with patient   What is the patient's perception of their health status since discharge?  Improving   Is the patient/caregiver able to teach back signs and symptoms related to disease process for when to call PCP?  Yes   Is the patient/caregiver able to teach back signs and symptoms related to disease process for when to call 911?  Yes   Is the patient/caregiver able to teach back the hierarchy of who to call/visit for symptoms/problems? PCP, Specialist, Home health  nurse, Urgent Care, ED, 911  Yes   Additional teach back comments  Call MD if low po intake continues, Poor urine output, chest pain or SOB.    Week 2 Call Completed?  Yes          Brad Leavitt RN

## 2019-06-26 ENCOUNTER — READMISSION MANAGEMENT (OUTPATIENT)
Dept: CALL CENTER | Facility: HOSPITAL | Age: 69
End: 2019-06-26

## 2019-06-26 NOTE — OUTREACH NOTE
Medical Week 3 Survey      Responses   Facility patient discharged from?  [place]   Does the patient have one of the following disease processes/diagnoses(primary or secondary)?  Other   Week 3 attempt successful?  No   Unsuccessful attempts  Attempt 1          Radha Fuentes RN

## 2019-06-27 ENCOUNTER — READMISSION MANAGEMENT (OUTPATIENT)
Dept: CALL CENTER | Facility: HOSPITAL | Age: 69
End: 2019-06-27

## 2019-07-08 ENCOUNTER — APPOINTMENT (OUTPATIENT)
Dept: GENERAL RADIOLOGY | Facility: HOSPITAL | Age: 69
End: 2019-07-08

## 2019-07-08 ENCOUNTER — HOSPITAL ENCOUNTER (EMERGENCY)
Facility: HOSPITAL | Age: 69
Discharge: HOME | End: 2019-07-09
Attending: EMERGENCY MEDICINE | Admitting: EMERGENCY MEDICINE

## 2019-07-08 DIAGNOSIS — R07.89 ATYPICAL CHEST PAIN: Primary | ICD-10-CM

## 2019-07-08 LAB
ALBUMIN SERPL-MCNC: 3.9 G/DL (ref 3.5–5.2)
ALBUMIN/GLOB SERPL: 1.4 G/DL
ALP SERPL-CCNC: 162 U/L (ref 39–117)
ALT SERPL W P-5'-P-CCNC: 19 U/L (ref 1–33)
ANION GAP SERPL CALCULATED.3IONS-SCNC: 10.2 MMOL/L (ref 5–15)
AST SERPL-CCNC: 20 U/L (ref 1–32)
BASOPHILS # BLD AUTO: 0.03 10*3/MM3 (ref 0–0.2)
BASOPHILS NFR BLD AUTO: 0.5 % (ref 0–1.5)
BILIRUB SERPL-MCNC: 0.2 MG/DL (ref 0.2–1.2)
BUN BLD-MCNC: 22 MG/DL (ref 8–23)
BUN/CREAT SERPL: 24.4 (ref 7–25)
CALCIUM SPEC-SCNC: 8.9 MG/DL (ref 8.6–10.5)
CHLORIDE SERPL-SCNC: 98 MMOL/L (ref 98–107)
CO2 SERPL-SCNC: 25.8 MMOL/L (ref 22–29)
CREAT BLD-MCNC: 0.9 MG/DL (ref 0.57–1)
DEPRECATED RDW RBC AUTO: 46.8 FL (ref 37–54)
EOSINOPHIL # BLD AUTO: 0.13 10*3/MM3 (ref 0–0.4)
EOSINOPHIL NFR BLD AUTO: 2 % (ref 0.3–6.2)
ERYTHROCYTE [DISTWIDTH] IN BLOOD BY AUTOMATED COUNT: 13.5 % (ref 12.3–15.4)
GFR SERPL CREATININE-BSD FRML MDRD: 62 ML/MIN/1.73
GLOBULIN UR ELPH-MCNC: 2.7 GM/DL
GLUCOSE BLD-MCNC: 128 MG/DL (ref 65–99)
HCT VFR BLD AUTO: 31.8 % (ref 34–46.6)
HGB BLD-MCNC: 10 G/DL (ref 12–15.9)
IMM GRANULOCYTES # BLD AUTO: 0.02 10*3/MM3 (ref 0–0.05)
IMM GRANULOCYTES NFR BLD AUTO: 0.3 % (ref 0–0.5)
LYMPHOCYTES # BLD AUTO: 0.98 10*3/MM3 (ref 0.7–3.1)
LYMPHOCYTES NFR BLD AUTO: 15 % (ref 19.6–45.3)
MCH RBC QN AUTO: 29.8 PG (ref 26.6–33)
MCHC RBC AUTO-ENTMCNC: 31.4 G/DL (ref 31.5–35.7)
MCV RBC AUTO: 94.6 FL (ref 79–97)
MONOCYTES # BLD AUTO: 0.63 10*3/MM3 (ref 0.1–0.9)
MONOCYTES NFR BLD AUTO: 9.6 % (ref 5–12)
NEUTROPHILS # BLD AUTO: 4.74 10*3/MM3 (ref 1.7–7)
NEUTROPHILS NFR BLD AUTO: 72.6 % (ref 42.7–76)
NRBC BLD AUTO-RTO: 0 /100 WBC (ref 0–0.2)
NT-PROBNP SERPL-MCNC: 859.6 PG/ML (ref 5–900)
PLATELET # BLD AUTO: 176 10*3/MM3 (ref 140–450)
PMV BLD AUTO: 9.6 FL (ref 6–12)
POTASSIUM BLD-SCNC: 5 MMOL/L (ref 3.5–5.2)
PROT SERPL-MCNC: 6.6 G/DL (ref 6–8.5)
RBC # BLD AUTO: 3.36 10*6/MM3 (ref 3.77–5.28)
SODIUM BLD-SCNC: 134 MMOL/L (ref 136–145)
TROPONIN T SERPL-MCNC: <0.01 NG/ML (ref 0–0.03)
WBC NRBC COR # BLD: 6.53 10*3/MM3 (ref 3.4–10.8)

## 2019-07-08 PROCEDURE — 85025 COMPLETE CBC W/AUTO DIFF WBC: CPT | Performed by: NURSE PRACTITIONER

## 2019-07-08 PROCEDURE — 99284 EMERGENCY DEPT VISIT MOD MDM: CPT

## 2019-07-08 PROCEDURE — 84484 ASSAY OF TROPONIN QUANT: CPT | Performed by: NURSE PRACTITIONER

## 2019-07-08 PROCEDURE — 80053 COMPREHEN METABOLIC PANEL: CPT | Performed by: NURSE PRACTITIONER

## 2019-07-08 PROCEDURE — 93005 ELECTROCARDIOGRAM TRACING: CPT | Performed by: EMERGENCY MEDICINE

## 2019-07-08 PROCEDURE — 83880 ASSAY OF NATRIURETIC PEPTIDE: CPT | Performed by: NURSE PRACTITIONER

## 2019-07-08 PROCEDURE — 93010 ELECTROCARDIOGRAM REPORT: CPT | Performed by: INTERNAL MEDICINE

## 2019-07-08 PROCEDURE — 71046 X-RAY EXAM CHEST 2 VIEWS: CPT

## 2019-07-08 PROCEDURE — 93005 ELECTROCARDIOGRAM TRACING: CPT

## 2019-07-08 RX ORDER — SODIUM CHLORIDE 0.9 % (FLUSH) 0.9 %
10 SYRINGE (ML) INJECTION AS NEEDED
Status: DISCONTINUED | OUTPATIENT
Start: 2019-07-08 | End: 2019-07-09 | Stop reason: HOSPADM

## 2019-07-09 VITALS
DIASTOLIC BLOOD PRESSURE: 66 MMHG | HEART RATE: 64 BPM | SYSTOLIC BLOOD PRESSURE: 153 MMHG | TEMPERATURE: 98.9 F | BODY MASS INDEX: 26.37 KG/M2 | RESPIRATION RATE: 18 BRPM | HEIGHT: 66 IN | OXYGEN SATURATION: 97 %

## 2019-07-09 LAB — TROPONIN T SERPL-MCNC: <0.01 NG/ML (ref 0–0.03)

## 2019-07-09 PROCEDURE — 96374 THER/PROPH/DIAG INJ IV PUSH: CPT

## 2019-07-09 PROCEDURE — 84484 ASSAY OF TROPONIN QUANT: CPT | Performed by: NURSE PRACTITIONER

## 2019-07-09 PROCEDURE — 25010000002 ONDANSETRON PER 1 MG: Performed by: NURSE PRACTITIONER

## 2019-07-09 RX ORDER — SUCRALFATE 1 G/1
1 TABLET ORAL 4 TIMES DAILY
Qty: 40 TABLET | Refills: 0 | Status: SHIPPED | OUTPATIENT
Start: 2019-07-09

## 2019-07-09 RX ORDER — ACETAMINOPHEN 500 MG
1000 TABLET ORAL ONCE
Status: COMPLETED | OUTPATIENT
Start: 2019-07-09 | End: 2019-07-09

## 2019-07-09 RX ORDER — ONDANSETRON 2 MG/ML
4 INJECTION INTRAMUSCULAR; INTRAVENOUS ONCE
Status: COMPLETED | OUTPATIENT
Start: 2019-07-09 | End: 2019-07-09

## 2019-07-09 RX ADMIN — ONDANSETRON HYDROCHLORIDE 4 MG: 2 SOLUTION INTRAMUSCULAR; INTRAVENOUS at 00:32

## 2019-07-09 RX ADMIN — ACETAMINOPHEN 1000 MG: 500 TABLET, FILM COATED ORAL at 00:48

## 2019-07-09 NOTE — ED TRIAGE NOTES
To ER via EMS.  C/o CP/SOA x 2 hours.   mg PO and NTG x 2 per EMS with relief of symptoms. Pt with hx of COPD O2 2l/nc.

## 2019-07-09 NOTE — DISCHARGE INSTRUCTIONS
Medications as ordered  Continue current home medications  Follow up with pmd and GI- call in am for appointments  Return to er for fever, chills, vomiting, chest pain, shortness of air, or any new or worsening symptoms

## 2019-07-09 NOTE — ED PROVIDER NOTES
EMERGENCY DEPARTMENT ENCOUNTER    CHIEF COMPLAINT  Chief Complaint: CP  History given by:pt, EMS  History limited by:nothing  Time Seen: 2141  Room Number: 30/30  PMD: System, Provider Not In  Dr Silvio Barclay, pulmonology    HPI:  Pt is a 68 y.o. female with a hx of CHF who presents with CP characterized as pressure starting 2 hours ago   Pt was watching TV when the pain began. Her pain has mostly resolved since onset. Patient also complains of SOA,  BLE edema and nausea.  Patient denies fever, cough, and all other complaints at this time. En route via EMS, pt was given 324mg Aspirin and 2 Nitro. Pt wears oxygen at home. Pt takes daily Aspirin and Inderal for HTN.   Past Medical History of HTN, CHF and COPD.     Duration: 2 hours  Timing:constant  Location:chest  Radiation:none  Quality:pressure  Intensity/Severity:moderate  Progression:mostly resolved  Associated Symptoms:SOA, nausea, BLE edema  Aggravating Factors:none  Alleviating Factors:none  Previous Episodes:hx COPD  Treatment before arrival:pt was given 324mg Aspirin and 2 Nitro. She takes daily Aspirin and Indural for HTN.     PAST MEDICAL HISTORY  Active Ambulatory Problems     Diagnosis Date Noted   • Dizzy 03/28/2019   • Visual changes 03/28/2019   • Generalized weakness 03/28/2019   • Acute nonintractable headache 03/28/2019   • Arthritis 03/28/2019   • Brain aneurysm 03/28/2019   • COPD (chronic obstructive pulmonary disease) (CMS/[facility]) 03/28/2019   • Disease of thyroid gland 03/28/2019   • Legally blind 03/28/2019   • GERD (gastroesophageal reflux disease) 03/28/2019   • Migraine 03/28/2019   • Seizures (CMS/[facility]) 03/28/2019   • Nausea & vomiting 03/30/2019   • Abdominal pain, epigastric 04/01/2019   • Precordial pain 06/03/2019   • Non-intractable vomiting with nausea 06/02/2019   • Non-cardiac chest pain 06/06/2019     Resolved Ambulatory Problems     Diagnosis Date Noted   • No Resolved Ambulatory Problems     Past Medical History:   Diagnosis  Date   • Arthritis    • Asthma    • Brain aneurysm    • CHF (congestive heart failure) (CMS/HCC)    • COPD (chronic obstructive pulmonary disease) (CMS/HCC)    • Disease of thyroid gland    • GERD (gastroesophageal reflux disease)    • Legally blind    • Migraine    • Seizures (CMS/HCC)    • Stroke (CMS/HCC)        PAST SURGICAL HISTORY  Past Surgical History:   Procedure Laterality Date   • CEREBRAL ANEURYSM REPAIR     •  SECTION     • CHOLECYSTECTOMY     • CRANIOTOMY     • ENDOSCOPY N/A 2019    Procedure: ESOPHAGOGASTRODUODENOSCOPY WITH BIOPSIES;  Surgeon: Jovanni Chavez MD;  Location: [facility] ENDOSCOPY;  Service: Gastroenterology   • HEMORRHOIDECTOMY     • HERNIA REPAIR     • HYSTERECTOMY     • WRIST SURGERY         FAMILY HISTORY  Family History   Problem Relation Age of Onset   • Breast cancer Mother    • Colon cancer Cousin    • Colon cancer Maternal Aunt    • Colon cancer Maternal Uncle        SOCIAL HISTORY  Social History     Socioeconomic History   • Marital status:      Spouse name: Not on file   • Number of children: Not on file   • Years of education: Not on file   • Highest education level: Not on file   Tobacco Use   • Smoking status: Never Smoker   • Smokeless tobacco: Never Used   Substance and Sexual Activity   • Alcohol use: No   • Drug use: No   • Sexual activity: Defer         ALLERGIES  Adhesive tape; Ambien [zolpidem]; Strawberry extract; and Topiramate    REVIEW OF SYSTEMS  Review of Systems   Constitutional: Negative for chills and fever.   HENT: Negative for sore throat.    Respiratory: Positive for shortness of breath.    Cardiovascular: Positive for chest pain and leg swelling (bilaterally).   Gastrointestinal: Positive for nausea. Negative for vomiting.   Genitourinary: Negative for dysuria.   Musculoskeletal: Negative for back pain.   Skin: Negative for rash.   Neurological: Negative for dizziness.   Psychiatric/Behavioral: The patient is not nervous/anxious.         PHYSICAL EXAM  ED Triage Vitals [07/08/19 2125]   Temp Heart Rate Resp BP SpO2   98.9 °F (37.2 °C) 75 16 170/66 96 %     Physical Exam   Constitutional: She is well-developed, well-nourished, and in no distress.   Speaking in full sentences.   HENT:   Head: Normocephalic.   Mouth/Throat: Mucous membranes are normal.   Eyes: No scleral icterus.   Neck: Normal range of motion.   Cardiovascular: Normal rate, regular rhythm and normal heart sounds.   Pulmonary/Chest: Effort normal and breath sounds normal.   Musculoskeletal: Normal range of motion. She exhibits edema.   Mild edema to bilateral LE.   Neurological: She is alert.   Skin: Skin is warm and dry.   Psychiatric: Mood and affect normal.   Nursing note and vitals reviewed.      LAB RESULTS  Recent Results (from the past 24 hour(s))   Comprehensive Metabolic Panel    Collection Time: 07/08/19 10:07 PM   Result Value Ref Range    Glucose 128 (H) 65 - 99 mg/dL    BUN 22 8 - 23 mg/dL    Creatinine 0.90 0.57 - 1.00 mg/dL    Sodium 134 (L) 136 - 145 mmol/L    Potassium 5.0 3.5 - 5.2 mmol/L    Chloride 98 98 - 107 mmol/L    CO2 25.8 22.0 - 29.0 mmol/L    Calcium 8.9 8.6 - 10.5 mg/dL    Total Protein 6.6 6.0 - 8.5 g/dL    Albumin 3.90 3.50 - 5.20 g/dL    ALT (SGPT) 19 1 - 33 U/L    AST (SGOT) 20 1 - 32 U/L    Alkaline Phosphatase 162 (H) 39 - 117 U/L    Total Bilirubin 0.2 0.2 - 1.2 mg/dL    eGFR Non African Amer 62 >60 mL/min/1.73    Globulin 2.7 gm/dL    A/G Ratio 1.4 g/dL    BUN/Creatinine Ratio 24.4 7.0 - 25.0    Anion Gap 10.2 5.0 - 15.0 mmol/L   BNP    Collection Time: 07/08/19 10:07 PM   Result Value Ref Range    proBNP 859.6 5.0 - 900.0 pg/mL   Troponin    Collection Time: 07/08/19 10:07 PM   Result Value Ref Range    Troponin T <0.010 0.000 - 0.030 ng/mL   CBC Auto Differential    Collection Time: 07/08/19 10:07 PM   Result Value Ref Range    WBC 6.53 3.40 - 10.80 10*3/mm3    RBC 3.36 (L) 3.77 - 5.28 10*6/mm3    Hemoglobin 10.0 (L) 12.0 - 15.9 g/dL     Hematocrit 31.8 (L) 34.0 - 46.6 %    MCV 94.6 79.0 - 97.0 fL    MCH 29.8 26.6 - 33.0 pg    MCHC 31.4 (L) 31.5 - 35.7 g/dL    RDW 13.5 12.3 - 15.4 %    RDW-SD 46.8 37.0 - 54.0 fl    MPV 9.6 6.0 - 12.0 fL    Platelets 176 140 - 450 10*3/mm3    Neutrophil % 72.6 42.7 - 76.0 %    Lymphocyte % 15.0 (L) 19.6 - 45.3 %    Monocyte % 9.6 5.0 - 12.0 %    Eosinophil % 2.0 0.3 - 6.2 %    Basophil % 0.5 0.0 - 1.5 %    Immature Grans % 0.3 0.0 - 0.5 %    Neutrophils, Absolute 4.74 1.70 - 7.00 10*3/mm3    Lymphocytes, Absolute 0.98 0.70 - 3.10 10*3/mm3    Monocytes, Absolute 0.63 0.10 - 0.90 10*3/mm3    Eosinophils, Absolute 0.13 0.00 - 0.40 10*3/mm3    Basophils, Absolute 0.03 0.00 - 0.20 10*3/mm3    Immature Grans, Absolute 0.02 0.00 - 0.05 10*3/mm3    nRBC 0.0 0.0 - 0.2 /100 WBC   Troponin    Collection Time: 07/09/19 12:59 AM   Result Value Ref Range    Troponin T <0.010 0.000 - 0.030 ng/mL       I ordered the above labs and reviewed the results    RADIOLOGY  XR Chest 2 View   Final Result   1. No active disease.       This report was finalized on 7/8/2019 10:40 PM by Lemuel Shepard M.D.              I ordered the above noted radiological studies and reviewed the images on the PACS system.       EKG    ekg was interpreted by Dr. Brown, see Dr. Brown's note for interpretation.      MEDICAL RECORD REVIEW    Pt discharged 6/6/2019. Summarized as follows:  Patient is a 68 y.o. female presented with chest discomfort which began on the afternoon of 6/3/2019.  This was associated with chills and nausea and emesis.  The chest discomfort was diffuse over the entire anterior chest.  There was no associated shortness of air.  He did have a complaint of new sinus congestion.  Chest pain was felt to be atypical for cardiac pain.  She had 2- troponins in the emergency room.  CT angiogram of the chest was negative for pulmonary embolism and showed no infiltrate.  Her EKG did not demonstrate acute changes.  It was felt that from a clinical  standpoint this chest pain did not appear to be cardiac.  She did not have any further work-up of that.  She sees Dr. Orellana checked in our office.  She sees him for palpitations and has been on some propranolol for quite some time.  The dose each is a little unclear what she had been on.  Currently she is on 80 mg 3 times a day which was started I think at admission a few days ago.  She is tolerating that heart rate wise and blood pressure wise so I will continue that at discharge and Dr. Orellana can decide about the dosage when he sees her later.  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  She will be continued on PPIs chronically for that per Dr. Chavez's recommendation.  He is complaining of some nasal congestion sore throat etc. since admission.  She will be given an antihistamine for that and continues on the nasal spray and Mucinex.  She will be advised to follow-up with her primary care provider for that.  For other prior diagnoses see the admission history and physical and other progress notes.  These were not acute problems during her hospital stay.  Procedures Performed  Procedure:  ESOPHAGOGASTRODUODENOSCOPY WITH BIOPSIES  06/06 0946 [facility] ENDOSCOPY      PROGRESS AND CONSULTS    2300. Reviewed pt's history and workup with Dr. Brown.  At bedside evaluation, they agree with the plan of care.    0131. BP- 146/63 HR- 61 Temp- 98.9 °F (37.2 °C) (Tympanic) O2 sat- 97%  Rechecked the patient who is in NAD and is resting comfortably. Informed pt of second troponin resulting within normal limits and subsequent plan for discharge. Pt understands and agrees with the plan, all questions answered.    Reviewed implications of results, diagnosis, meds, responsibility to follow up, warning signs and symptoms of possible worsening, potential complications and reasons to return to ER with patient.  Discussed all results and  "noted any abnormalities with patient.    Discussed plan for discharge, as there is no emergent indication for admission.  Pt is agreeable and understands need for follow up and repeat testing.  Pt is aware that discharge does not mean that nothing is wrong but it indicates no emergency is present.  Pt is discharged with instructions to follow up with primary care doctor to have their blood pressure rechecked.       DIAGNOSIS  Final diagnoses:   Atypical chest pain       FOLLOW UP   Milton Ramos MD  [address]  [place] [address]  [phone]    Call in 1 day      Your PMD    In 1 day        RX     Medication List      New Prescriptions    sucralfate 1 g tablet  Commonly known as:  CARAFATE  Take 1 tablet by mouth 4 (Four) Times a Day.          COURSE & MEDICAL DECISION MAKING  Pertinent Labs and Imaging studies that were ordered and reviewed are noted above.  Results were reviewed/discussed with the patient and they were also made aware of online assess.   Pt also made aware that some labs, such as cultures, will not be resulted during ER visit and follow up with PMD is necessary.     MEDICATIONS GIVEN IN ER  Medications   sodium chloride 0.9 % flush 10 mL (not administered)   ondansetron (ZOFRAN) injection 4 mg (4 mg Intravenous Given 7/9/19 0032)   acetaminophen (TYLENOL) tablet 1,000 mg (1,000 mg Oral Given 7/9/19 0048)       /63 (BP Location: Right arm, Patient Position: Lying)   Pulse 61   Temp 98.9 °F (37.2 °C) (Tympanic)   Resp 16   Ht 167.6 cm (66\")   SpO2 97%   BMI 26.37 kg/m²       I personally reviewed the past medical history, past surgical history, social history, family history, current medications and allergies as they appear in this chart.  The scribe's note accurately reflects the work and decisions made by me.     Documentation assistance provided by nigel Kim for RENEA Hess on 7/8/2019 at 2:03 AM. Information recorded by the scribe " was done at my direction and has been verified and validated by me.       Barb Kim  07/09/19 0136       Dyan Holt APRN  07/09/19 0203

## 2019-07-09 NOTE — ED NOTES
Spoke with CELESTINO Maldonado eta 1-1.5 hours for transport home.      Roxanne Aguilera RN  07/09/19 0140

## 2019-07-09 NOTE — ED PROVIDER NOTES
MD ATTESTATION NOTE    Pt presents to the ED complaining of chest pressure that began 2 hours ago. Pt was admitted June 2 - June 6 for chest pain. Pt was worked up by Dr. Avila, cardiology, and was diagnosed with hiatal hernia, and Winston's esophagus.     Physical Exam:  AAOx3 and NAD  Heart RRR  Lungs CTAB  No chest tenderness  Abdomen is soft and nontender  1+ pedal edema BLE    I agree with the plan to check labs and imaging.    EKG    EKG time: 2137  Rhythm/Rate: NSR, 59  No Acute Ischemia  Non-Specific ST-T changes    Unchanged compared to prior on Brenda 3, 2019    Interpreted Contemporaneously by me.  Independently viewed by me      The ARMANI and I have discussed this patient's history, physical exam, and treatment plan.  I have reviewed the documentation and personally had a face to face interaction with the patient. I affirm the documentation and agree with the treatment and plan.  The attached note describes my personal findings.    Documentation assistance provided by nigel Nicholas for Dr. Kevin MD.  Information recorded by the scribe was done at my direction and has been verified and validated by me.     Radha Nicholas  07/08/19 1115       Emil Brown MD  07/10/19 0024

## 2019-07-14 ENCOUNTER — APPOINTMENT (OUTPATIENT)
Dept: GENERAL RADIOLOGY | Facility: HOSPITAL | Age: 69
End: 2019-07-14

## 2019-07-14 ENCOUNTER — HOSPITAL ENCOUNTER (EMERGENCY)
Facility: HOSPITAL | Age: 69
Discharge: HOME | End: 2019-07-15
Attending: EMERGENCY MEDICINE | Admitting: EMERGENCY MEDICINE

## 2019-07-14 DIAGNOSIS — R07.9 CHEST PAIN IN ADULT: Primary | ICD-10-CM

## 2019-07-14 DIAGNOSIS — R51.9 NONINTRACTABLE HEADACHE, UNSPECIFIED CHRONICITY PATTERN, UNSPECIFIED HEADACHE TYPE: ICD-10-CM

## 2019-07-14 PROCEDURE — 99284 EMERGENCY DEPT VISIT MOD MDM: CPT

## 2019-07-14 PROCEDURE — 71046 X-RAY EXAM CHEST 2 VIEWS: CPT

## 2019-07-14 PROCEDURE — 93005 ELECTROCARDIOGRAM TRACING: CPT | Performed by: EMERGENCY MEDICINE

## 2019-07-14 PROCEDURE — 93010 ELECTROCARDIOGRAM REPORT: CPT | Performed by: INTERNAL MEDICINE

## 2019-07-14 RX ORDER — ASPIRIN 325 MG
325 TABLET ORAL ONCE
Status: DISCONTINUED | OUTPATIENT
Start: 2019-07-14 | End: 2019-07-15

## 2019-07-14 RX ORDER — SODIUM CHLORIDE 0.9 % (FLUSH) 0.9 %
10 SYRINGE (ML) INJECTION AS NEEDED
Status: DISCONTINUED | OUTPATIENT
Start: 2019-07-14 | End: 2019-07-15 | Stop reason: HOSPADM

## 2019-07-15 ENCOUNTER — APPOINTMENT (OUTPATIENT)
Dept: CT IMAGING | Facility: HOSPITAL | Age: 69
End: 2019-07-15

## 2019-07-15 VITALS
WEIGHT: 181.2 LBS | BODY MASS INDEX: 33.34 KG/M2 | TEMPERATURE: 98.3 F | SYSTOLIC BLOOD PRESSURE: 173 MMHG | OXYGEN SATURATION: 99 % | HEIGHT: 62 IN | RESPIRATION RATE: 18 BRPM | HEART RATE: 61 BPM | DIASTOLIC BLOOD PRESSURE: 72 MMHG

## 2019-07-15 LAB
ALBUMIN SERPL-MCNC: 3.9 G/DL (ref 3.5–5.2)
ALBUMIN/GLOB SERPL: 1.6 G/DL
ALP SERPL-CCNC: 153 U/L (ref 39–117)
ALT SERPL W P-5'-P-CCNC: 18 U/L (ref 1–33)
ANION GAP SERPL CALCULATED.3IONS-SCNC: 10.9 MMOL/L (ref 5–15)
AST SERPL-CCNC: 17 U/L (ref 1–32)
BASOPHILS # BLD AUTO: 0.05 10*3/MM3 (ref 0–0.2)
BASOPHILS NFR BLD AUTO: 0.6 % (ref 0–1.5)
BILIRUB SERPL-MCNC: 0.3 MG/DL (ref 0.2–1.2)
BUN BLD-MCNC: 17 MG/DL (ref 8–23)
BUN/CREAT SERPL: 17.3 (ref 7–25)
CALCIUM SPEC-SCNC: 9.7 MG/DL (ref 8.6–10.5)
CHLORIDE SERPL-SCNC: 91 MMOL/L (ref 98–107)
CO2 SERPL-SCNC: 27.1 MMOL/L (ref 22–29)
CREAT BLD-MCNC: 0.98 MG/DL (ref 0.57–1)
DEPRECATED RDW RBC AUTO: 46.1 FL (ref 37–54)
EOSINOPHIL # BLD AUTO: 0.11 10*3/MM3 (ref 0–0.4)
EOSINOPHIL NFR BLD AUTO: 1.4 % (ref 0.3–6.2)
ERYTHROCYTE [DISTWIDTH] IN BLOOD BY AUTOMATED COUNT: 13.5 % (ref 12.3–15.4)
GFR SERPL CREATININE-BSD FRML MDRD: 56 ML/MIN/1.73
GLOBULIN UR ELPH-MCNC: 2.5 GM/DL
GLUCOSE BLD-MCNC: 106 MG/DL (ref 65–99)
HCT VFR BLD AUTO: 31.6 % (ref 34–46.6)
HGB BLD-MCNC: 10.2 G/DL (ref 12–15.9)
HOLD SPECIMEN: NORMAL
IMM GRANULOCYTES # BLD AUTO: 0.03 10*3/MM3 (ref 0–0.05)
IMM GRANULOCYTES NFR BLD AUTO: 0.4 % (ref 0–0.5)
LYMPHOCYTES # BLD AUTO: 1.32 10*3/MM3 (ref 0.7–3.1)
LYMPHOCYTES NFR BLD AUTO: 17.1 % (ref 19.6–45.3)
MCH RBC QN AUTO: 30 PG (ref 26.6–33)
MCHC RBC AUTO-ENTMCNC: 32.3 G/DL (ref 31.5–35.7)
MCV RBC AUTO: 92.9 FL (ref 79–97)
MONOCYTES # BLD AUTO: 0.84 10*3/MM3 (ref 0.1–0.9)
MONOCYTES NFR BLD AUTO: 10.9 % (ref 5–12)
NEUTROPHILS # BLD AUTO: 5.37 10*3/MM3 (ref 1.7–7)
NEUTROPHILS NFR BLD AUTO: 69.6 % (ref 42.7–76)
NRBC BLD AUTO-RTO: 0 /100 WBC (ref 0–0.2)
PLATELET # BLD AUTO: 175 10*3/MM3 (ref 140–450)
PMV BLD AUTO: 8.7 FL (ref 6–12)
POTASSIUM BLD-SCNC: 4.4 MMOL/L (ref 3.5–5.2)
PROT SERPL-MCNC: 6.4 G/DL (ref 6–8.5)
RBC # BLD AUTO: 3.4 10*6/MM3 (ref 3.77–5.28)
SODIUM BLD-SCNC: 129 MMOL/L (ref 136–145)
TROPONIN T SERPL-MCNC: <0.01 NG/ML (ref 0–0.03)
TROPONIN T SERPL-MCNC: <0.01 NG/ML (ref 0–0.03)
WBC NRBC COR # BLD: 7.72 10*3/MM3 (ref 3.4–10.8)
WHOLE BLOOD HOLD SPECIMEN: NORMAL

## 2019-07-15 PROCEDURE — 84484 ASSAY OF TROPONIN QUANT: CPT | Performed by: EMERGENCY MEDICINE

## 2019-07-15 PROCEDURE — 80053 COMPREHEN METABOLIC PANEL: CPT | Performed by: EMERGENCY MEDICINE

## 2019-07-15 PROCEDURE — 96374 THER/PROPH/DIAG INJ IV PUSH: CPT

## 2019-07-15 PROCEDURE — 96375 TX/PRO/DX INJ NEW DRUG ADDON: CPT

## 2019-07-15 PROCEDURE — 70450 CT HEAD/BRAIN W/O DYE: CPT

## 2019-07-15 PROCEDURE — 25010000002 ONDANSETRON PER 1 MG: Performed by: EMERGENCY MEDICINE

## 2019-07-15 PROCEDURE — 85025 COMPLETE CBC W/AUTO DIFF WBC: CPT | Performed by: EMERGENCY MEDICINE

## 2019-07-15 PROCEDURE — 25010000002 MORPHINE PER 10 MG: Performed by: EMERGENCY MEDICINE

## 2019-07-15 RX ORDER — MORPHINE SULFATE 2 MG/ML
4 INJECTION, SOLUTION INTRAMUSCULAR; INTRAVENOUS ONCE
Status: COMPLETED | OUTPATIENT
Start: 2019-07-15 | End: 2019-07-15

## 2019-07-15 RX ORDER — ONDANSETRON 2 MG/ML
4 INJECTION INTRAMUSCULAR; INTRAVENOUS ONCE
Status: COMPLETED | OUTPATIENT
Start: 2019-07-15 | End: 2019-07-15

## 2019-07-15 RX ADMIN — MORPHINE SULFATE 4 MG: 2 INJECTION, SOLUTION INTRAMUSCULAR; INTRAVENOUS at 01:22

## 2019-07-15 RX ADMIN — ONDANSETRON 4 MG: 2 INJECTION INTRAMUSCULAR; INTRAVENOUS at 01:22

## 2019-07-21 ENCOUNTER — HOSPITAL ENCOUNTER (EMERGENCY)
Facility: HOSPITAL | Age: 69
Discharge: ANOTHER HOSPITAL | End: 2019-07-21
Attending: EMERGENCY MEDICINE | Admitting: EMERGENCY MEDICINE

## 2019-07-21 ENCOUNTER — APPOINTMENT (OUTPATIENT)
Dept: CT IMAGING | Facility: HOSPITAL | Age: 69
End: 2019-07-21

## 2019-07-21 ENCOUNTER — HOSPITAL ENCOUNTER (EMERGENCY)
Facility: HOSPITAL | Age: 69
End: 2019-07-21

## 2019-07-21 VITALS
TEMPERATURE: 98.2 F | DIASTOLIC BLOOD PRESSURE: 73 MMHG | OXYGEN SATURATION: 97 % | WEIGHT: 159 LBS | BODY MASS INDEX: 25.55 KG/M2 | HEART RATE: 61 BPM | RESPIRATION RATE: 17 BRPM | SYSTOLIC BLOOD PRESSURE: 163 MMHG | HEIGHT: 66 IN

## 2019-07-21 DIAGNOSIS — R51.9 INTRACTABLE HEADACHE, UNSPECIFIED CHRONICITY PATTERN, UNSPECIFIED HEADACHE TYPE: Primary | ICD-10-CM

## 2019-07-21 DIAGNOSIS — I67.1 BRAIN ANEURYSM: ICD-10-CM

## 2019-07-21 DIAGNOSIS — E87.1 HYPONATREMIA: ICD-10-CM

## 2019-07-21 LAB
ANION GAP SERPL CALCULATED.3IONS-SCNC: 11.7 MMOL/L (ref 5–15)
BACTERIA UR QL AUTO: NORMAL /HPF
BASOPHILS # BLD AUTO: 0.05 10*3/MM3 (ref 0–0.2)
BASOPHILS NFR BLD AUTO: 0.7 % (ref 0–1.5)
BILIRUB UR QL STRIP: NEGATIVE
BUN BLD-MCNC: 23 MG/DL (ref 8–23)
BUN/CREAT SERPL: 19.8 (ref 7–25)
CALCIUM SPEC-SCNC: 9.9 MG/DL (ref 8.6–10.5)
CHLORIDE SERPL-SCNC: 86 MMOL/L (ref 98–107)
CLARITY UR: CLEAR
CO2 SERPL-SCNC: 27.3 MMOL/L (ref 22–29)
COLOR UR: YELLOW
CREAT BLD-MCNC: 1.16 MG/DL (ref 0.57–1)
DEPRECATED RDW RBC AUTO: 43.9 FL (ref 37–54)
EOSINOPHIL # BLD AUTO: 0.1 10*3/MM3 (ref 0–0.4)
EOSINOPHIL NFR BLD AUTO: 1.4 % (ref 0.3–6.2)
ERYTHROCYTE [DISTWIDTH] IN BLOOD BY AUTOMATED COUNT: 13.2 % (ref 12.3–15.4)
GFR SERPL CREATININE-BSD FRML MDRD: 46 ML/MIN/1.73
GLUCOSE BLD-MCNC: 127 MG/DL (ref 65–99)
GLUCOSE UR STRIP-MCNC: NEGATIVE MG/DL
HCT VFR BLD AUTO: 33.6 % (ref 34–46.6)
HGB BLD-MCNC: 11.1 G/DL (ref 12–15.9)
HGB UR QL STRIP.AUTO: ABNORMAL
HYALINE CASTS UR QL AUTO: NORMAL /LPF
IMM GRANULOCYTES # BLD AUTO: 0.05 10*3/MM3 (ref 0–0.05)
IMM GRANULOCYTES NFR BLD AUTO: 0.7 % (ref 0–0.5)
KETONES UR QL STRIP: NEGATIVE
LEUKOCYTE ESTERASE UR QL STRIP.AUTO: NEGATIVE
LYMPHOCYTES # BLD AUTO: 1.6 10*3/MM3 (ref 0.7–3.1)
LYMPHOCYTES NFR BLD AUTO: 22.2 % (ref 19.6–45.3)
MCH RBC QN AUTO: 29.8 PG (ref 26.6–33)
MCHC RBC AUTO-ENTMCNC: 33 G/DL (ref 31.5–35.7)
MCV RBC AUTO: 90.3 FL (ref 79–97)
MONOCYTES # BLD AUTO: 0.74 10*3/MM3 (ref 0.1–0.9)
MONOCYTES NFR BLD AUTO: 10.3 % (ref 5–12)
NEUTROPHILS # BLD AUTO: 4.66 10*3/MM3 (ref 1.7–7)
NEUTROPHILS NFR BLD AUTO: 64.7 % (ref 42.7–76)
NITRITE UR QL STRIP: NEGATIVE
NRBC BLD AUTO-RTO: 0 /100 WBC (ref 0–0.2)
PH UR STRIP.AUTO: 7.5 [PH] (ref 5–8)
PHENYTOIN SERPL-MCNC: 9 MCG/ML (ref 10–20)
PLATELET # BLD AUTO: 241 10*3/MM3 (ref 140–450)
PMV BLD AUTO: 9.2 FL (ref 6–12)
POTASSIUM BLD-SCNC: 4.6 MMOL/L (ref 3.5–5.2)
PROT UR QL STRIP: ABNORMAL
RBC # BLD AUTO: 3.72 10*6/MM3 (ref 3.77–5.28)
RBC # UR: NORMAL /HPF
REF LAB TEST METHOD: NORMAL
SODIUM BLD-SCNC: 125 MMOL/L (ref 136–145)
SP GR UR STRIP: 1.01 (ref 1–1.03)
SQUAMOUS #/AREA URNS HPF: NORMAL /HPF
UROBILINOGEN UR QL STRIP: ABNORMAL
WBC NRBC COR # BLD: 7.2 10*3/MM3 (ref 3.4–10.8)
WBC UR QL AUTO: NORMAL /HPF

## 2019-07-21 PROCEDURE — 96374 THER/PROPH/DIAG INJ IV PUSH: CPT

## 2019-07-21 PROCEDURE — 93010 ELECTROCARDIOGRAM REPORT: CPT | Performed by: INTERNAL MEDICINE

## 2019-07-21 PROCEDURE — 99285 EMERGENCY DEPT VISIT HI MDM: CPT

## 2019-07-21 PROCEDURE — 85025 COMPLETE CBC W/AUTO DIFF WBC: CPT | Performed by: EMERGENCY MEDICINE

## 2019-07-21 PROCEDURE — 93005 ELECTROCARDIOGRAM TRACING: CPT | Performed by: EMERGENCY MEDICINE

## 2019-07-21 PROCEDURE — 80048 BASIC METABOLIC PNL TOTAL CA: CPT | Performed by: EMERGENCY MEDICINE

## 2019-07-21 PROCEDURE — 25010000002 MORPHINE PER 10 MG: Performed by: EMERGENCY MEDICINE

## 2019-07-21 PROCEDURE — 81001 URINALYSIS AUTO W/SCOPE: CPT | Performed by: EMERGENCY MEDICINE

## 2019-07-21 PROCEDURE — 96376 TX/PRO/DX INJ SAME DRUG ADON: CPT

## 2019-07-21 PROCEDURE — 25010000002 DIPHENHYDRAMINE PER 50 MG: Performed by: EMERGENCY MEDICINE

## 2019-07-21 PROCEDURE — 25010000002 ONDANSETRON PER 1 MG: Performed by: EMERGENCY MEDICINE

## 2019-07-21 PROCEDURE — 70450 CT HEAD/BRAIN W/O DYE: CPT

## 2019-07-21 PROCEDURE — 96375 TX/PRO/DX INJ NEW DRUG ADDON: CPT

## 2019-07-21 PROCEDURE — 96361 HYDRATE IV INFUSION ADD-ON: CPT

## 2019-07-21 PROCEDURE — 80185 ASSAY OF PHENYTOIN TOTAL: CPT | Performed by: EMERGENCY MEDICINE

## 2019-07-21 PROCEDURE — 25010000002 PROCHLORPERAZINE 10 MG/2ML SOLUTION: Performed by: EMERGENCY MEDICINE

## 2019-07-21 RX ORDER — ONDANSETRON 2 MG/ML
4 INJECTION INTRAMUSCULAR; INTRAVENOUS ONCE
Status: COMPLETED | OUTPATIENT
Start: 2019-07-21 | End: 2019-07-21

## 2019-07-21 RX ORDER — PROCHLORPERAZINE EDISYLATE 5 MG/ML
10 INJECTION INTRAMUSCULAR; INTRAVENOUS EVERY 6 HOURS PRN
Status: DISCONTINUED | OUTPATIENT
Start: 2019-07-21 | End: 2019-07-22 | Stop reason: HOSPADM

## 2019-07-21 RX ORDER — MORPHINE SULFATE 2 MG/ML
2 INJECTION, SOLUTION INTRAMUSCULAR; INTRAVENOUS ONCE
Status: DISCONTINUED | OUTPATIENT
Start: 2019-07-21 | End: 2019-07-21

## 2019-07-21 RX ORDER — MORPHINE SULFATE 2 MG/ML
2 INJECTION, SOLUTION INTRAMUSCULAR; INTRAVENOUS ONCE
Status: COMPLETED | OUTPATIENT
Start: 2019-07-21 | End: 2019-07-21

## 2019-07-21 RX ORDER — SODIUM CHLORIDE 9 MG/ML
125 INJECTION, SOLUTION INTRAVENOUS CONTINUOUS
Status: DISCONTINUED | OUTPATIENT
Start: 2019-07-21 | End: 2019-07-22 | Stop reason: HOSPADM

## 2019-07-21 RX ORDER — SODIUM CHLORIDE 0.9 % (FLUSH) 0.9 %
10 SYRINGE (ML) INJECTION AS NEEDED
Status: DISCONTINUED | OUTPATIENT
Start: 2019-07-21 | End: 2019-07-22 | Stop reason: HOSPADM

## 2019-07-21 RX ORDER — DIPHENHYDRAMINE HYDROCHLORIDE 50 MG/ML
25 INJECTION INTRAMUSCULAR; INTRAVENOUS ONCE
Status: COMPLETED | OUTPATIENT
Start: 2019-07-21 | End: 2019-07-21

## 2019-07-21 RX ADMIN — SODIUM CHLORIDE 125 ML/HR: 9 INJECTION, SOLUTION INTRAVENOUS at 20:30

## 2019-07-21 RX ADMIN — SODIUM CHLORIDE 1000 ML: 9 INJECTION, SOLUTION INTRAVENOUS at 16:56

## 2019-07-21 RX ADMIN — DIPHENHYDRAMINE HYDROCHLORIDE 25 MG: 50 INJECTION, SOLUTION INTRAMUSCULAR; INTRAVENOUS at 19:06

## 2019-07-21 RX ADMIN — SODIUM CHLORIDE 500 ML: 9 INJECTION, SOLUTION INTRAVENOUS at 19:05

## 2019-07-21 RX ADMIN — MORPHINE SULFATE 2 MG: 2 INJECTION, SOLUTION INTRAMUSCULAR; INTRAVENOUS at 20:22

## 2019-07-21 RX ADMIN — PROCHLORPERAZINE EDISYLATE 10 MG: 5 INJECTION INTRAMUSCULAR; INTRAVENOUS at 19:07

## 2019-07-21 RX ADMIN — MORPHINE SULFATE 2 MG: 2 INJECTION, SOLUTION INTRAMUSCULAR; INTRAVENOUS at 16:53

## 2019-07-21 RX ADMIN — ONDANSETRON HYDROCHLORIDE 4 MG: 2 SOLUTION INTRAMUSCULAR; INTRAVENOUS at 16:53

## 2023-06-07 ENCOUNTER — INPATIENT HOSPITAL (OUTPATIENT)
Dept: URBAN - METROPOLITAN AREA HOSPITAL 107 | Facility: HOSPITAL | Age: 73
End: 2023-06-07
Payer: MEDICARE

## 2023-06-07 DIAGNOSIS — K92.2 GASTROINTESTINAL HEMORRHAGE, UNSPECIFIED: ICD-10-CM

## 2023-06-07 PROCEDURE — 99222 1ST HOSP IP/OBS MODERATE 55: CPT | Performed by: INTERNAL MEDICINE

## 2023-06-08 ENCOUNTER — INPATIENT HOSPITAL (OUTPATIENT)
Dept: URBAN - METROPOLITAN AREA HOSPITAL 107 | Facility: HOSPITAL | Age: 73
End: 2023-06-08
Payer: MEDICARE

## 2023-06-08 DIAGNOSIS — K29.70 GASTRITIS, UNSPECIFIED, WITHOUT BLEEDING: ICD-10-CM

## 2023-06-08 DIAGNOSIS — Z96.89 PRESENCE OF OTHER SPECIFIED FUNCTIONAL IMPLANTS: ICD-10-CM

## 2023-06-08 DIAGNOSIS — K92.1 MELENA: ICD-10-CM

## 2023-06-08 PROCEDURE — 43235 EGD DIAGNOSTIC BRUSH WASH: CPT | Performed by: INTERNAL MEDICINE

## 2023-11-17 ENCOUNTER — HOSPITAL ENCOUNTER (OUTPATIENT)
Dept: OTHER | Facility: HOSPITAL | Age: 73
Discharge: HOME | End: 2023-11-17

## 2024-02-14 ENCOUNTER — TRANSCRIBE ORDERS (OUTPATIENT)
Dept: ADMINISTRATIVE | Facility: HOSPITAL | Age: 74
End: 2024-02-14

## 2024-02-14 DIAGNOSIS — R10.2 PELVIC PAIN: Primary | ICD-10-CM

## 2024-02-14 DIAGNOSIS — R07.9 CHEST PAIN, UNSPECIFIED TYPE: Primary | ICD-10-CM

## 2024-02-14 DIAGNOSIS — C18.4 ADENOCARCINOMA OF TRANSVERSE COLON: ICD-10-CM

## 2024-02-22 ENCOUNTER — TRANSCRIBE ORDERS (OUTPATIENT)
Dept: ADMINISTRATIVE | Facility: HOSPITAL | Age: 74
End: 2024-02-22
Payer: MEDICARE

## 2024-02-22 DIAGNOSIS — C18.4 ADENOCARCINOMA OF TRANSVERSE COLON: ICD-10-CM

## 2024-02-22 DIAGNOSIS — R10.2 PELVIC PAIN: Primary | ICD-10-CM

## 2024-02-27 ENCOUNTER — HOSPITAL ENCOUNTER (OUTPATIENT)
Dept: CT IMAGING | Facility: HOSPITAL | Age: 74
Discharge: HOME | End: 2024-02-27
Admitting: NURSE PRACTITIONER
Payer: MEDICARE

## 2024-02-27 DIAGNOSIS — R10.2 PELVIC PAIN: ICD-10-CM

## 2024-02-27 DIAGNOSIS — C18.4 ADENOCARCINOMA OF TRANSVERSE COLON: ICD-10-CM

## 2024-02-27 LAB — CREAT BLDA-MCNC: 2 MG/DL (ref 0.6–1.3)

## 2024-02-27 PROCEDURE — 0 DIATRIZOATE MEGLUMINE & SODIUM PER 1 ML: Performed by: NURSE PRACTITIONER

## 2024-02-27 PROCEDURE — 74176 CT ABD & PELVIS W/O CONTRAST: CPT

## 2024-02-27 PROCEDURE — 82565 ASSAY OF CREATININE: CPT

## 2024-02-27 PROCEDURE — 71250 CT THORAX DX C-: CPT

## 2024-02-27 RX ADMIN — DIATRIZOATE MEGLUMINE AND DIATRIZOATE SODIUM 30 ML: 660; 100 LIQUID ORAL; RECTAL at 16:29

## 2025-07-28 ENCOUNTER — TRANSCRIBE ORDERS (OUTPATIENT)
Dept: ADMINISTRATIVE | Facility: HOSPITAL | Age: 75
End: 2025-07-28
Payer: MEDICARE

## 2025-07-29 ENCOUNTER — TRANSCRIBE ORDERS (OUTPATIENT)
Dept: ADMINISTRATIVE | Facility: HOSPITAL | Age: 75
End: 2025-07-29
Payer: MEDICARE

## 2025-07-29 DIAGNOSIS — I67.1 CEREBRAL ANEURYSM, NONRUPTURED: Primary | ICD-10-CM

## 2025-08-04 ENCOUNTER — HOSPITAL ENCOUNTER (OUTPATIENT)
Dept: CT IMAGING | Facility: HOSPITAL | Age: 75
Discharge: HOME | End: 2025-08-04
Admitting: INTERNAL MEDICINE
Payer: MEDICARE

## 2025-08-04 DIAGNOSIS — I67.1 CEREBRAL ANEURYSM, NONRUPTURED: ICD-10-CM

## 2025-08-04 PROCEDURE — 70450 CT HEAD/BRAIN W/O DYE: CPT

## (undated) DEVICE — FRCP BX RADJAW4 NDL 2.8 240CM LG OG BX40

## (undated) DEVICE — THE TORRENT IRRIGATION SCOPE CONNECTOR IS USED WITH THE TORRENT IRRIGATION TUBING TO PROVIDE IRRIGATION FLUIDS SUCH AS STERILE WATER DURING GASTROINTESTINAL ENDOSCOPIC PROCEDURES WHEN USED IN CONJUNCTION WITH AN IRRIGATION PUMP (OR ELECTROSURGICAL UNIT).: Brand: TORRENT

## (undated) DEVICE — BITEBLOCK OMNI BLOC

## (undated) DEVICE — TUBING, SUCTION, 1/4" X 10', STRAIGHT: Brand: MEDLINE

## (undated) DEVICE — CANN NASL CO2 TRULINK W/O2 A/

## (undated) DEVICE — Device: Brand: DEFENDO AIR/WATER/SUCTION AND BIOPSY VALVE